# Patient Record
Sex: FEMALE | Race: WHITE | Employment: UNEMPLOYED
[De-identification: names, ages, dates, MRNs, and addresses within clinical notes are randomized per-mention and may not be internally consistent; named-entity substitution may affect disease eponyms.]

---

## 2017-11-12 ENCOUNTER — HEALTH MAINTENANCE LETTER (OUTPATIENT)
Age: 52
End: 2017-11-12

## 2018-03-20 ENCOUNTER — HOSPITAL ENCOUNTER (OUTPATIENT)
Dept: MAMMOGRAPHY | Facility: CLINIC | Age: 53
Discharge: HOME | End: 2018-03-20
Attending: FAMILY MEDICINE | Admitting: FAMILY MEDICINE
Payer: COMMERCIAL

## 2018-03-20 ENCOUNTER — OFFICE VISIT (OUTPATIENT)
Dept: FAMILY MEDICINE | Facility: CLINIC | Age: 53
End: 2018-03-20
Payer: COMMERCIAL

## 2018-03-20 VITALS
DIASTOLIC BLOOD PRESSURE: 80 MMHG | SYSTOLIC BLOOD PRESSURE: 124 MMHG | WEIGHT: 178.5 LBS | HEART RATE: 66 BPM | BODY MASS INDEX: 27.05 KG/M2 | HEIGHT: 68 IN

## 2018-03-20 DIAGNOSIS — B35.1 ONYCHOMYCOSIS: ICD-10-CM

## 2018-03-20 DIAGNOSIS — Z13.1 SCREENING FOR DIABETES MELLITUS: ICD-10-CM

## 2018-03-20 DIAGNOSIS — Z12.11 SPECIAL SCREENING FOR MALIGNANT NEOPLASMS, COLON: ICD-10-CM

## 2018-03-20 DIAGNOSIS — Z00.00 ROUTINE GENERAL MEDICAL EXAMINATION AT A HEALTH CARE FACILITY: Primary | ICD-10-CM

## 2018-03-20 DIAGNOSIS — Z80.0 FAMILY HISTORY OF COLON CANCER: ICD-10-CM

## 2018-03-20 DIAGNOSIS — Z13.6 CARDIOVASCULAR SCREENING; LDL GOAL LESS THAN 160: ICD-10-CM

## 2018-03-20 DIAGNOSIS — Z12.31 VISIT FOR SCREENING MAMMOGRAM: ICD-10-CM

## 2018-03-20 LAB
ALBUMIN SERPL-MCNC: 3.8 G/DL (ref 3.4–5)
ALP SERPL-CCNC: 60 U/L (ref 40–150)
ALT SERPL W P-5'-P-CCNC: 22 U/L (ref 0–50)
AST SERPL W P-5'-P-CCNC: 16 U/L (ref 0–45)
BILIRUB DIRECT SERPL-MCNC: <0.1 MG/DL (ref 0–0.2)
BILIRUB SERPL-MCNC: 0.4 MG/DL (ref 0.2–1.3)
CHOLEST SERPL-MCNC: 213 MG/DL
GLUCOSE SERPL-MCNC: 84 MG/DL (ref 70–99)
HDLC SERPL-MCNC: 80 MG/DL
LDLC SERPL CALC-MCNC: 123 MG/DL
NONHDLC SERPL-MCNC: 133 MG/DL
PROT SERPL-MCNC: 7.7 G/DL (ref 6.8–8.8)
TRIGL SERPL-MCNC: 50 MG/DL

## 2018-03-20 PROCEDURE — 77067 SCR MAMMO BI INCL CAD: CPT

## 2018-03-20 PROCEDURE — 80076 HEPATIC FUNCTION PANEL: CPT | Performed by: FAMILY MEDICINE

## 2018-03-20 PROCEDURE — 36415 COLL VENOUS BLD VENIPUNCTURE: CPT | Performed by: FAMILY MEDICINE

## 2018-03-20 PROCEDURE — 99396 PREV VISIT EST AGE 40-64: CPT | Performed by: FAMILY MEDICINE

## 2018-03-20 PROCEDURE — 82947 ASSAY GLUCOSE BLOOD QUANT: CPT | Performed by: FAMILY MEDICINE

## 2018-03-20 PROCEDURE — 80061 LIPID PANEL: CPT | Performed by: FAMILY MEDICINE

## 2018-03-20 RX ORDER — TERBINAFINE HYDROCHLORIDE 250 MG/1
250 TABLET ORAL DAILY
Qty: 90 TABLET | Refills: 0 | Status: SHIPPED | OUTPATIENT
Start: 2018-03-20 | End: 2021-03-26

## 2018-03-20 NOTE — PATIENT INSTRUCTIONS
1. To lab for blood work  Schedule the colonoscopy [phone]    Preventive Health Recommendations  Female Ages 50 - 64    Yearly exam: See your health care provider every year in order to  o Review health changes.   o Discuss preventive care.    o Review your medicines if your doctor has prescribed any.      Get a Pap test every three years (unless you have an abnormal result and your provider advises testing more often).    If you get Pap tests with HPV test, you only need to test every 5 years, unless you have an abnormal result.     You do not need a Pap test if your uterus was removed (hysterectomy) and you have not had cancer.    You should be tested each year for STDs (sexually transmitted diseases) if you're at risk.     Have a mammogram every 1 to 2 years.    Have a colonoscopy at age 50, or have a yearly FIT test (stool test). These exams screen for colon cancer.      Have a cholesterol test every 5 years, or more often if advised.    Have a diabetes test (fasting glucose) every three years. If you are at risk for diabetes, you should have this test more often.     If you are at risk for osteoporosis (brittle bone disease), think about having a bone density scan (DEXA).    Shots: Get a flu shot each year. Get a tetanus shot every 10 years.    Nutrition:     Eat at least 5 servings of fruits and vegetables each day.    Eat whole-grain bread, whole-wheat pasta and brown rice instead of white grains and rice.    Talk to your provider about Calcium and Vitamin D.     Lifestyle    Exercise at least 150 minutes a week (30 minutes a day, 5 days a week). This will help you control your weight and prevent disease.    Limit alcohol to one drink per day.    No smoking.     Wear sunscreen to prevent skin cancer.     See your dentist every six months for an exam and cleaning.    See your eye doctor every 1 to 2 years.

## 2018-03-20 NOTE — LETTER
March 20, 2018      Lisa Fontaine  [address] [address]        Dear ,    We are writing to inform you of your test results.    Cholesterol was just a little high, not too high to need medication.  To improve your cholesterol exercise 30 minutes per day five days a week, increase eating fresh or frozen fruits and vegetables at least 5 per day, increase eating lean meats like fish, and avoid fried foods.  Normal glucose and liver tests.    Resulted Orders   Lipid panel reflex to direct LDL Fasting   Result Value Ref Range    Cholesterol 213 (H) <200 mg/dL      Comment:      Desirable:       <200 mg/dl    Triglycerides 50 <150 mg/dL    HDL Cholesterol 80 >49 mg/dL    LDL Cholesterol Calculated 123 (H) <100 mg/dL      Comment:      Above desirable:  100-129 mg/dl  Borderline High:  130-159 mg/dL  High:             160-189 mg/dL  Very high:       >189 mg/dl      Non HDL Cholesterol 133 (H) <130 mg/dL      Comment:      Above Desirable:  130-159 mg/dl  Borderline high:  160-189 mg/dl  High:             190-219 mg/dl  Very high:       >219 mg/dl     GLUCOSE   Result Value Ref Range    Glucose 84 70 - 99 mg/dL   Hepatic panel   Result Value Ref Range    Bilirubin Direct <0.1 0.0 - 0.2 mg/dL    Bilirubin Total 0.4 0.2 - 1.3 mg/dL    Albumin 3.8 3.4 - 5.0 g/dL    Protein Total 7.7 6.8 - 8.8 g/dL    Alkaline Phosphatase 60 40 - 150 U/L    ALT 22 0 - 50 U/L    AST 16 0 - 45 U/L       If you have any questions or concerns, please call the clinic at the number listed above.       Sincerely,        Willis Diaz MD/cb

## 2018-03-20 NOTE — NURSING NOTE
"Chief Complaint   Patient presents with     Physical       Initial /80  Pulse 66  Ht 5' 8\" (1.727 m)  Wt 178 lb 8 oz (81 kg)  LMP 01/01/2008  BMI 27.14 kg/m2 Estimated body mass index is 27.14 kg/(m^2) as calculated from the following:    Height as of this encounter: 5' 8\" (1.727 m).    Weight as of this encounter: 178 lb 8 oz (81 kg).  Medication Reconciliation: complete  "

## 2018-03-20 NOTE — PROGRESS NOTES
SUBJECTIVE:   CC: Lisa Fontaine is an 52 year old woman who presents for preventive health visit.     Healthy Habits:    Do you get at least three servings of calcium containing foods daily (dairy, green leafy vegetables, etc.)? yes    Amount of exercise or daily activities, outside of work: 3 day(s) per week    Problems taking medications regularly No    Medication side effects: No    Have you had an eye exam in the past two years? yes    Do you see a dentist twice per year? yes    Do you have sleep apnea, excessive snoring or daytime drowsiness?no    - Check skin, she notices periotic red spots on legs and abdomen.      Today's PHQ-2 Score:   PHQ-2 ( 1999 [employer]) 10/7/2014 4/4/2012   Q1: Little interest or pleasure in doing things 0 1   Q2: Feeling down, depressed or hopeless 1 0   PHQ-2 Score 1 1       Abuse: Current or Past(Physical, Sexual or Emotional)- No  Do you feel safe in your environment - Yes    Social History   Substance Use Topics     Smoking status: Never Smoker     Smokeless tobacco: Never Used     Alcohol use Yes      Comment: rare     If you drink alcohol do you typically have >3 drinks per day or >7 drinks per week? No                     Reviewed orders with patient.  Reviewed health maintenance and updated orders accordingly - Yes  BP Readings from Last 3 Encounters:   03/20/18 124/80   03/09/16 112/59   10/07/14 (!) 156/94    Wt Readings from Last 3 Encounters:   03/20/18 178 lb 8 oz (81 kg)   03/09/16 250 lb (113.4 kg)   10/07/14 243 lb 8 oz (110.5 kg)           Patient over age 50, mutual decision to screen reflected in health maintenance.    Pertinent mammograms are reviewed under the imaging tab.  History of abnormal Pap smear: NO - age 30- 65 PAP every 3 years recommended    Reviewed and updated as needed this visit by clinical staff         Reviewed and updated as needed this visit by Provider            ROS:  C: NEGATIVE for fever, chills, change in weight  I: NEGATIVE for  "worrisome rashes, moles or lesions  E: NEGATIVE for vision changes or irritation  ENT: NEGATIVE for ear, mouth and throat problems  R: NEGATIVE for significant cough or SOB  B: NEGATIVE for masses, tenderness or discharge  CV: NEGATIVE for chest pain, palpitations or peripheral edema  GI: NEGATIVE for nausea, abdominal pain, heartburn, or change in bowel habits  : NEGATIVE for unusual urinary or vaginal symptoms. No vaginal bleeding.  M: NEGATIVE for significant arthralgias or myalgia  N: NEGATIVE for weakness, dizziness or paresthesias  P: NEGATIVE for changes in mood or affect     OBJECTIVE:   /80  Pulse 66  Ht 5' 8\" (1.727 m)  Wt 178 lb 8 oz (81 kg)  LMP 01/01/2008  BMI 27.14 kg/m2  EXAM:  GENERAL: healthy, alert and no distress  EYES: Eyes grossly normal to inspection, PERRL and conjunctivae and sclerae normal  HENT: ear canals and TM's normal, nose and mouth without ulcers or lesions  NECK: no adenopathy, no asymmetry, masses, or scars and thyroid normal to palpation  RESP: lungs clear to auscultation - no rales, rhonchi or wheezes  BREAST: normal without masses, tenderness or nipple discharge and no palpable axillary masses or adenopathy  CV: regular rate and rhythm, normal S1 S2, no S3 or S4, no murmur, click or rub, no peripheral edema and peripheral pulses strong  ABDOMEN: soft, nontender, no hepatosplenomegaly, no masses and bowel sounds normal  MS: no gross musculoskeletal defects noted, no edema  GYN: normal external genitalia (no skin tags seen)  SKIN: Toe nails right toes thickened yellow brittle, no suspicious lesions or rashes  NEURO: Normal strength and tone, mentation intact and speech normal  PSYCH: mentation appears normal, affect normal/bright    ASSESSMENT/PLAN:   Lisa was seen today for physical.    Diagnoses and all orders for this visit:    Routine general medical examination at a health care facility    CARDIOVASCULAR SCREENING; LDL GOAL LESS THAN 160  -     Lipid panel " "reflex to direct LDL Fasting    Screening for diabetes mellitus  -     GLUCOSE    Onychomycosis  -     terbinafine (LAMISIL) 250 MG tablet; Take 1 tablet (250 mg) by mouth daily  -     Hepatic panel  -     Hepatic panel; Future    Special screening for malignant neoplasms, colon  -     GASTROENTEROLOGY ADULT REF PROCEDURE ONLY    Family history of colon cancer  mother  -     GASTROENTEROLOGY ADULT REF PROCEDURE ONLY        COUNSELING:   Reviewed preventive health counseling, as reflected in patient instructions       Regular exercise       Healthy diet/nutrition       Vision screening       Colon cancer screening         reports that she has never smoked. She has never used smokeless tobacco.    Estimated body mass index is 38.01 kg/(m^2) as calculated from the following:    Height as of 3/9/16: 5' 8\" (1.727 m).    Weight as of 3/9/16: 250 lb (113.4 kg).   Weight management plan: Discussed healthy diet and exercise guidelines and patient will follow up in 12 months in clinic to re-evaluate. Weight decreasing after S[employer], then stopped 2 months ago and weight has gone up so planning to go back to that plan.    Counseling Resources:  ATP IV Guidelines  Pooled Cohorts Equation Calculator  Breast Cancer Risk Calculator  FRAX Risk Assessment  ICSI Preventive Guidelines  Dietary Guidelines for Americans, 2010  USDA's MyPlate  ASA Prophylaxis  Lung CA Screening    Willis Diaz MD  [facility]  "

## 2018-03-20 NOTE — MR AVS SNAPSHOT
After Visit Summary   3/20/2018    Lisa Fontaine    MRN: [id number]           Patient Information     Date Of Birth          1965        Visit Information        Provider Department      3/20/2018 8:00 AM Willis Diaz MD [facility]        Today's Diagnoses     Routine general medical examination at a health care facility    -  1    CARDIOVASCULAR SCREENING; LDL GOAL LESS THAN 160        Screening for diabetes mellitus        Onychomycosis        Special screening for malignant neoplasms, colon        Family history of colon cancer  mother          Care Instructions    1. To lab for blood work  Schedule the colonoscopy [phone]    Preventive Health Recommendations  Female Ages 50 - 64    Yearly exam: See your health care provider every year in order to  o Review health changes.   o Discuss preventive care.    o Review your medicines if your doctor has prescribed any.      Get a Pap test every three years (unless you have an abnormal result and your provider advises testing more often).    If you get Pap tests with HPV test, you only need to test every 5 years, unless you have an abnormal result.     You do not need a Pap test if your uterus was removed (hysterectomy) and you have not had cancer.    You should be tested each year for STDs (sexually transmitted diseases) if you're at risk.     Have a mammogram every 1 to 2 years.    Have a colonoscopy at age 50, or have a yearly FIT test (stool test). These exams screen for colon cancer.      Have a cholesterol test every 5 years, or more often if advised.    Have a diabetes test (fasting glucose) every three years. If you are at risk for diabetes, you should have this test more often.     If you are at risk for osteoporosis (brittle bone disease), think about having a bone density scan (DEXA).    Shots: Get a flu shot each year. Get a tetanus shot every 10 years.    Nutrition:     Eat at least 5 servings of fruits and  vegetables each day.    Eat whole-grain bread, whole-wheat pasta and brown rice instead of white grains and rice.    Talk to your provider about Calcium and Vitamin D.     Lifestyle    Exercise at least 150 minutes a week (30 minutes a day, 5 days a week). This will help you control your weight and prevent disease.    Limit alcohol to one drink per day.    No smoking.     Wear sunscreen to prevent skin cancer.     See your dentist every six months for an exam and cleaning.    See your eye doctor every 1 to 2 years.            Follow-ups after your visit        Additional Services     GASTROENTEROLOGY ADULT REF PROCEDURE ONLY       Last Lab Result: Creatinine (mg/dL)       Date                     Value                 04/04/2012               0.59             ----------  Body mass index is 27.14 kg/(m^2).     Needed:  No  Language:  English    Patient will be contacted to schedule procedure.     Please be aware that coverage of these services is subject to the terms and limitations of your health insurance plan.  Call member services at your health plan with any benefit or coverage questions.  Any procedures must be performed at a [place] facility OR coordinated by your clinic's referral office.    Please bring the following with you to your appointment:    (1) Any X-Rays, CTs or MRIs which have been performed.  Contact the facility where they were done to arrange for  prior to your scheduled appointment.    (2) List of current medications   (3) This referral request   (4) Any documents/labs given to you for this referral                  Future tests that were ordered for you today     Open Future Orders        Priority Expected Expires Ordered    Hepatic panel Routine  4/20/2018 3/20/2018    MA Screening Digital Bilateral Routine  3/19/2019 3/19/2018            Who to contact     If you have questions or need follow up information about today's clinic visit or your schedule please contact  "[facility] directly at [phone].  Normal or non-critical lab and imaging results will be communicated to you by MyChart, letter or phone within 4 business days after the clinic has received the results. If you do not hear from us within 7 days, please contact the clinic through MyChart or phone. If you have a critical or abnormal lab result, we will notify you by phone as soon as possible.  Submit refill requests through Evi or call your pharmacy and they will forward the refill request to us. Please allow 3 business days for your refill to be completed.          Additional Information About Your Visit        GeoVarioharDriftToIt Information     Evi lets you send messages to your doctor, view your test results, renew your prescriptions, schedule appointments and more. To sign up, go to [email or web address] . Click on \"Log in\" on the left side of the screen, which will take you to the Welcome page. Then click on \"Sign up Now\" on the right side of the page.     You will be asked to enter the access code listed below, as well as some personal information. Please follow the directions to create your username and password.     Your access code is: [email or web address]  Expires: 2018  8:50 AM     Your access code will  in 90 days. If you need help or a new code, please call your [facility] clinic or [phone].        Care EveryWhere ID     This is your Care EveryWhere ID. This could be used by other organizations to access your [facility] medical records  [id number]        Your Vitals Were     Pulse Height Last Period BMI (Body Mass Index)          66 5' 8\" (1.727 m) 2008 27.14 kg/m2         Blood Pressure from Last 3 Encounters:   18 124/80   16 112/59   10/07/14 (!) 156/94    Weight from Last 3 Encounters:   18 178 lb 8 oz (81 kg)   16 250 lb (113.4 kg)   10/07/14 243 lb 8 oz (110.5 kg)              We Performed the Following     GASTROENTEROLOGY ADULT REF PROCEDURE ONLY "     GLUCOSE     Hepatic panel     Lipid panel reflex to direct LDL Fasting          Today's Medication Changes          These changes are accurate as of 3/20/18  8:50 AM.  If you have any questions, ask your nurse or doctor.               Start taking these medicines.        Dose/Directions    terbinafine 250 MG tablet   Commonly known as:  lamISIL   Used for:  Onychomycosis   Started by:  Willis Diaz MD        Dose:  250 mg   Take 1 tablet (250 mg) by mouth daily   Quantity:  90 tablet   Refills:  0         Stop taking these medicines if you haven't already. Please contact your care team if you have questions.     albuterol 108 (90 BASE) MCG/ACT Inhaler   Commonly known as:  PROAIR HFA/PROVENTIL HFA/VENTOLIN HFA   Stopped by:  Willis Diaz MD           amoxicillin-clavulanate 875-125 MG per tablet   Commonly known as:  AUGMENTIN   Stopped by:  Willis Diaz MD                Where to get your medicines      These medications were sent to [facility] [address] [address]     Phone:  [phone]     terbinafine 250 MG tablet                Primary Care Provider Office Phone # Fax #    Maura Orlando Mcduffie [phone] [address] [facility] [address]        Equal Access to Services     HARIS COUGHLIN AH: Hadii roberto ku hadasho Soomaali, waaxda luqadaha, qaybta kaalmada adeegyada, waxjazmin moffettin hayninfan nerissa adrian. So [facility] [phone].    ATENCIÓN: Si habla español, tiene a man disposición servicios gratuitos de asistencia lingüística. Llame al [phone].    We comply with applicable federal civil rights laws and [place] laws. We do not discriminate on the basis of race, color, national origin, age, disability, sex, sexual orientation, or gender identity.            Thank you!     Thank you for choosing [facility]  for your care. Our goal is always to provide you with excellent  care. Hearing back from our patients is one way we can continue to improve our services. Please take a few minutes to complete the written survey that you may receive in the mail after your visit with us. Thank you!             Your Updated Medication List - Protect others around you: Learn how to safely use, store and throw away your medicines at www.disposemymeds.org.          This list is accurate as of 3/20/18  8:50 AM.  Always use your most recent med list.                   Brand Name Dispense Instructions for use Diagnosis    ADVIL PM PO      Take  by mouth as needed.        B-12 PO           BIOTIN PO      Take  by mouth as needed.        DAILY MULTIVITAMIN PO      Take  by mouth daily.        OMEGA 3 PO      Take  by mouth as needed.        terbinafine 250 MG tablet    lamISIL    90 tablet    Take 1 tablet (250 mg) by mouth daily    Onychomycosis       VITAMIN-B COMPLEX PO      Take  by mouth daily.        ZINC CITRATE      as needed.

## 2018-04-16 DIAGNOSIS — B35.1 ONYCHOMYCOSIS: ICD-10-CM

## 2018-04-16 LAB
ALBUMIN SERPL-MCNC: 3.6 G/DL (ref 3.4–5)
ALP SERPL-CCNC: 64 U/L (ref 40–150)
ALT SERPL W P-5'-P-CCNC: 20 U/L (ref 0–50)
AST SERPL W P-5'-P-CCNC: 18 U/L (ref 0–45)
BILIRUB DIRECT SERPL-MCNC: <0.1 MG/DL (ref 0–0.2)
BILIRUB SERPL-MCNC: 0.3 MG/DL (ref 0.2–1.3)
PROT SERPL-MCNC: 7.1 G/DL (ref 6.8–8.8)

## 2018-04-16 PROCEDURE — 80076 HEPATIC FUNCTION PANEL: CPT | Performed by: FAMILY MEDICINE

## 2018-04-16 PROCEDURE — 36415 COLL VENOUS BLD VENIPUNCTURE: CPT | Performed by: FAMILY MEDICINE

## 2018-05-01 ENCOUNTER — ANESTHESIA EVENT (OUTPATIENT)
Dept: GASTROENTEROLOGY | Facility: CLINIC | Age: 53
End: 2018-05-01
Payer: COMMERCIAL

## 2018-05-01 NOTE — ANESTHESIA PREPROCEDURE EVALUATION
Anesthesia Evaluation     . Pt has had prior anesthetic. Type: General and MAC    No history of anesthetic complications          ROS/MED HX    ENT/Pulmonary:  - neg pulmonary ROS     Neurologic:  - neg neurologic ROS     Cardiovascular:  - neg cardiovascular ROS       METS/Exercise Tolerance:  >4 METS   Hematologic:  - neg hematologic  ROS       Musculoskeletal: Comment: Low back pain     Lumbar radiculopathy     - neg musculoskeletal ROS       GI/Hepatic:  - neg GI/hepatic ROS       Renal/Genitourinary:  - ROS Renal section negative       Endo:     (+) Obesity, .      Psychiatric:  - neg psychiatric ROS       Infectious Disease:  - neg infectious disease ROS       Malignancy:      - no malignancy   Other:    - neg other ROS                 Physical Exam  Normal systems: cardiovascular and pulmonary    Airway   Mallampati: II  TM distance: <3 FB  Neck ROM: full    Dental   (+) caps    Cardiovascular       Pulmonary                     Anesthesia Plan      History & Physical Review      ASA Status:  1 .    NPO Status:  > 4 hours    Plan for MAC Reason for MAC:  Deep or markedly invasive procedure (G8)         Postoperative Care      Consents  Anesthetic plan, risks, benefits and alternatives discussed with:  Patient..                          .

## 2018-05-02 ENCOUNTER — ANESTHESIA (OUTPATIENT)
Dept: GASTROENTEROLOGY | Facility: CLINIC | Age: 53
End: 2018-05-02
Payer: COMMERCIAL

## 2018-05-02 ENCOUNTER — SURGERY (OUTPATIENT)
Age: 53
End: 2018-05-02
Payer: COMMERCIAL

## 2018-05-02 ENCOUNTER — HOSPITAL ENCOUNTER (OUTPATIENT)
Facility: CLINIC | Age: 53
Discharge: HOME | End: 2018-05-02
Attending: FAMILY MEDICINE | Admitting: FAMILY MEDICINE
Payer: COMMERCIAL

## 2018-05-02 VITALS
WEIGHT: 180 LBS | TEMPERATURE: 98.3 F | OXYGEN SATURATION: 96 % | DIASTOLIC BLOOD PRESSURE: 81 MMHG | BODY MASS INDEX: 27.28 KG/M2 | SYSTOLIC BLOOD PRESSURE: 122 MMHG | HEIGHT: 68 IN | RESPIRATION RATE: 16 BRPM

## 2018-05-02 LAB — COLONOSCOPY: NORMAL

## 2018-05-02 PROCEDURE — G0105 COLORECTAL SCRN; HI RISK IND: HCPCS | Performed by: SURGERY

## 2018-05-02 PROCEDURE — 25000125 ZZHC RX 250: Performed by: SURGERY

## 2018-05-02 PROCEDURE — 25000128 H RX IP 250 OP 636: Performed by: NURSE ANESTHETIST, CERTIFIED REGISTERED

## 2018-05-02 PROCEDURE — 25000128 H RX IP 250 OP 636: Performed by: SURGERY

## 2018-05-02 PROCEDURE — 45378 DIAGNOSTIC COLONOSCOPY: CPT | Performed by: SURGERY

## 2018-05-02 PROCEDURE — 25000125 ZZHC RX 250: Performed by: NURSE ANESTHETIST, CERTIFIED REGISTERED

## 2018-05-02 PROCEDURE — 37000008 ZZH ANESTHESIA TECHNICAL FEE, 1ST 30 MIN: Performed by: SURGERY

## 2018-05-02 RX ORDER — LIDOCAINE 40 MG/G
CREAM TOPICAL
Status: DISCONTINUED | OUTPATIENT
Start: 2018-05-02 | End: 2018-05-02 | Stop reason: HOSPADM

## 2018-05-02 RX ORDER — SODIUM CHLORIDE, SODIUM LACTATE, POTASSIUM CHLORIDE, CALCIUM CHLORIDE 600; 310; 30; 20 MG/100ML; MG/100ML; MG/100ML; MG/100ML
INJECTION, SOLUTION INTRAVENOUS CONTINUOUS
Status: DISCONTINUED | OUTPATIENT
Start: 2018-05-02 | End: 2018-05-02 | Stop reason: HOSPADM

## 2018-05-02 RX ORDER — PROPOFOL 10 MG/ML
INJECTION, EMULSION INTRAVENOUS CONTINUOUS PRN
Status: DISCONTINUED | OUTPATIENT
Start: 2018-05-02 | End: 2018-05-02

## 2018-05-02 RX ORDER — ONDANSETRON 2 MG/ML
4 INJECTION INTRAMUSCULAR; INTRAVENOUS
Status: DISCONTINUED | OUTPATIENT
Start: 2018-05-02 | End: 2018-05-02 | Stop reason: HOSPADM

## 2018-05-02 RX ORDER — GLYCOPYRROLATE 0.2 MG/ML
INJECTION, SOLUTION INTRAMUSCULAR; INTRAVENOUS PRN
Status: DISCONTINUED | OUTPATIENT
Start: 2018-05-02 | End: 2018-05-02

## 2018-05-02 RX ORDER — LIDOCAINE HYDROCHLORIDE 10 MG/ML
INJECTION, SOLUTION EPIDURAL; INFILTRATION; INTRACAUDAL; PERINEURAL PRN
Status: DISCONTINUED | OUTPATIENT
Start: 2018-05-02 | End: 2018-05-02

## 2018-05-02 RX ORDER — PROPOFOL 10 MG/ML
INJECTION, EMULSION INTRAVENOUS PRN
Status: DISCONTINUED | OUTPATIENT
Start: 2018-05-02 | End: 2018-05-02

## 2018-05-02 RX ADMIN — GLYCOPYRROLATE 0.2 MG: 0.2 INJECTION, SOLUTION INTRAMUSCULAR; INTRAVENOUS at 07:59

## 2018-05-02 RX ADMIN — LIDOCAINE HYDROCHLORIDE 50 MG: 10 INJECTION, SOLUTION EPIDURAL; INFILTRATION; INTRACAUDAL; PERINEURAL at 07:59

## 2018-05-02 RX ADMIN — PROPOFOL 200 MCG/KG/MIN: 10 INJECTION, EMULSION INTRAVENOUS at 08:00

## 2018-05-02 RX ADMIN — LIDOCAINE HYDROCHLORIDE 1 ML: 10 INJECTION, SOLUTION EPIDURAL; INFILTRATION; INTRACAUDAL; PERINEURAL at 07:25

## 2018-05-02 RX ADMIN — PROPOFOL 100 MG: 10 INJECTION, EMULSION INTRAVENOUS at 08:00

## 2018-05-02 RX ADMIN — SODIUM CHLORIDE, POTASSIUM CHLORIDE, SODIUM LACTATE AND CALCIUM CHLORIDE: 600; 310; 30; 20 INJECTION, SOLUTION INTRAVENOUS at 07:25

## 2018-05-02 RX ADMIN — PROPOFOL 50 MCG/KG/MIN: 10 INJECTION, EMULSION INTRAVENOUS at 08:06

## 2018-05-02 NOTE — ANESTHESIA CARE TRANSFER NOTE
Patient: Lisa Fontaine    Procedure(s):  colonoscopy - Wound Class: II-Clean Contaminated    Diagnosis: family history of colon cancer    screening  Diagnosis Additional Information: No value filed.    Anesthesia Type:   MAC     Note:  Airway :Room Air  Patient transferred to:Phase II  Handoff Report: Identifed the Patient, Identified the Reponsible Provider, Reviewed the pertinent medical history, Discussed the surgical course, Reviewed Intra-OP anesthesia mangement and issues during anesthesia, Set expectations for post-procedure period and Allowed opportunity for questions and acknowledgement of understanding      Vitals: (Last set prior to Anesthesia Care Transfer)    CRNA VITALS  5/2/2018 0741 - 5/2/2018 0811      5/2/2018             Pulse: 81    Ht Rate: 81    SpO2: 99 %                Electronically Signed By: SENTHIL Drake CRNA  May 2, 2018  8:11 AM

## 2018-05-02 NOTE — ANESTHESIA POSTPROCEDURE EVALUATION
Patient: Lisa Fontaine    Procedure(s):  colonoscopy - Wound Class: II-Clean Contaminated    Diagnosis:family history of colon cancer    screening  Diagnosis Additional Information: No value filed.    Anesthesia Type:  MAC    Note:  Anesthesia Post Evaluation    Patient location during evaluation: Bedside  Patient participation: Able to fully participate in evaluation  Level of consciousness: awake and alert  Pain management: adequate  Airway patency: patent  Cardiovascular status: acceptable  Respiratory status: acceptable  Hydration status: acceptable  PONV: none     Anesthetic complications: None          Last vitals:  Vitals:    05/02/18 0653   BP: 112/71   Resp: 16   Temp: 36.8  C (98.3  F)   SpO2: 100%         Electronically Signed By: SENTHIL Drake CRNA  May 2, 2018  8:12 AM

## 2021-03-26 ENCOUNTER — TELEPHONE (OUTPATIENT)
Dept: BEHAVIORAL HEALTH | Facility: CLINIC | Age: 56
End: 2021-03-26

## 2021-03-26 ENCOUNTER — HOSPITAL ENCOUNTER (INPATIENT)
Facility: CLINIC | Age: 56
LOS: 3 days | Discharge: HOME | DRG: 882 | End: 2021-03-29
Attending: PSYCHIATRY & NEUROLOGY | Admitting: PSYCHIATRY & NEUROLOGY
Payer: COMMERCIAL

## 2021-03-26 DIAGNOSIS — F31.2 BIPOLAR AFFECTIVE DISORDER, MANIC, SEVERE, WITH PSYCHOTIC BEHAVIOR (H): ICD-10-CM

## 2021-03-26 DIAGNOSIS — Z20.822 CONTACT WITH AND (SUSPECTED) EXPOSURE TO COVID-19: ICD-10-CM

## 2021-03-26 DIAGNOSIS — F29 PSYCHOSIS, UNSPECIFIED PSYCHOSIS TYPE (H): ICD-10-CM

## 2021-03-26 LAB
ALBUMIN SERPL-MCNC: 4.1 G/DL (ref 3.4–5)
ALP SERPL-CCNC: 84 U/L (ref 40–150)
ALT SERPL W P-5'-P-CCNC: 24 U/L (ref 0–50)
AMPHETAMINES UR QL SCN: NEGATIVE
ANION GAP SERPL CALCULATED.3IONS-SCNC: 6 MMOL/L (ref 3–14)
AST SERPL W P-5'-P-CCNC: 15 U/L (ref 0–45)
BARBITURATES UR QL: NEGATIVE
BASOPHILS # BLD AUTO: 0.1 10E9/L (ref 0–0.2)
BASOPHILS NFR BLD AUTO: 0.8 %
BENZODIAZ UR QL: NEGATIVE
BILIRUB SERPL-MCNC: 0.5 MG/DL (ref 0.2–1.3)
BUN SERPL-MCNC: 8 MG/DL (ref 7–30)
CALCIUM SERPL-MCNC: 9.4 MG/DL (ref 8.5–10.1)
CANNABINOIDS UR QL SCN: NEGATIVE
CHLORIDE SERPL-SCNC: 101 MMOL/L (ref 94–109)
CO2 SERPL-SCNC: 29 MMOL/L (ref 20–32)
COCAINE UR QL: NEGATIVE
CREAT SERPL-MCNC: 0.56 MG/DL (ref 0.52–1.04)
DIFFERENTIAL METHOD BLD: NORMAL
EOSINOPHIL # BLD AUTO: 0.1 10E9/L (ref 0–0.7)
EOSINOPHIL NFR BLD AUTO: 0.6 %
ERYTHROCYTE [DISTWIDTH] IN BLOOD BY AUTOMATED COUNT: 12.2 % (ref 10–15)
ETHANOL UR QL SCN: NEGATIVE
GFR SERPL CREATININE-BSD FRML MDRD: >90 ML/MIN/{1.73_M2}
GLUCOSE SERPL-MCNC: 89 MG/DL (ref 70–99)
HCT VFR BLD AUTO: 43 % (ref 35–47)
HGB BLD-MCNC: 14.7 G/DL (ref 11.7–15.7)
IMM GRANULOCYTES # BLD: 0 10E9/L (ref 0–0.4)
IMM GRANULOCYTES NFR BLD: 0.3 %
LABORATORY COMMENT REPORT: NORMAL
LYMPHOCYTES # BLD AUTO: 2.4 10E9/L (ref 0.8–5.3)
LYMPHOCYTES NFR BLD AUTO: 31.2 %
MCH RBC QN AUTO: 30.1 PG (ref 26.5–33)
MCHC RBC AUTO-ENTMCNC: 34.2 G/DL (ref 31.5–36.5)
MCV RBC AUTO: 88 FL (ref 78–100)
MONOCYTES # BLD AUTO: 0.8 10E9/L (ref 0–1.3)
MONOCYTES NFR BLD AUTO: 9.9 %
NEUTROPHILS # BLD AUTO: 4.4 10E9/L (ref 1.6–8.3)
NEUTROPHILS NFR BLD AUTO: 57.2 %
NRBC # BLD AUTO: 0 10*3/UL
NRBC BLD AUTO-RTO: 0 /100
OPIATES UR QL SCN: NEGATIVE
PLATELET # BLD AUTO: 358 10E9/L (ref 150–450)
POTASSIUM SERPL-SCNC: 4.2 MMOL/L (ref 3.4–5.3)
PROT SERPL-MCNC: 7.8 G/DL (ref 6.8–8.8)
RBC # BLD AUTO: 4.88 10E12/L (ref 3.8–5.2)
SARS-COV-2 RNA RESP QL NAA+PROBE: NEGATIVE
SODIUM SERPL-SCNC: 136 MMOL/L (ref 133–144)
SPECIMEN SOURCE: NORMAL
TSH SERPL DL<=0.005 MIU/L-ACNC: 1 MU/L (ref 0.4–4)
WBC # BLD AUTO: 7.8 10E9/L (ref 4–11)

## 2021-03-26 PROCEDURE — C9803 HOPD COVID-19 SPEC COLLECT: HCPCS | Performed by: PSYCHIATRY & NEUROLOGY

## 2021-03-26 PROCEDURE — 80307 DRUG TEST PRSMV CHEM ANLYZR: CPT | Performed by: PSYCHIATRY & NEUROLOGY

## 2021-03-26 PROCEDURE — 80320 DRUG SCREEN QUANTALCOHOLS: CPT | Performed by: PSYCHIATRY & NEUROLOGY

## 2021-03-26 PROCEDURE — 124N000002 HC R&B MH UMMC

## 2021-03-26 PROCEDURE — 85025 COMPLETE CBC W/AUTO DIFF WBC: CPT | Performed by: PSYCHIATRY & NEUROLOGY

## 2021-03-26 PROCEDURE — 99284 EMERGENCY DEPT VISIT MOD MDM: CPT | Performed by: PSYCHIATRY & NEUROLOGY

## 2021-03-26 PROCEDURE — 80053 COMPREHEN METABOLIC PANEL: CPT | Performed by: PSYCHIATRY & NEUROLOGY

## 2021-03-26 PROCEDURE — 87635 SARS-COV-2 COVID-19 AMP PRB: CPT | Performed by: PSYCHIATRY & NEUROLOGY

## 2021-03-26 PROCEDURE — 90791 PSYCH DIAGNOSTIC EVALUATION: CPT

## 2021-03-26 PROCEDURE — 84443 ASSAY THYROID STIM HORMONE: CPT | Performed by: PSYCHIATRY & NEUROLOGY

## 2021-03-26 PROCEDURE — 93005 ELECTROCARDIOGRAM TRACING: CPT

## 2021-03-26 PROCEDURE — 93010 ELECTROCARDIOGRAM REPORT: CPT | Performed by: INTERNAL MEDICINE

## 2021-03-26 PROCEDURE — 99285 EMERGENCY DEPT VISIT HI MDM: CPT | Mod: 25 | Performed by: PSYCHIATRY & NEUROLOGY

## 2021-03-26 RX ORDER — HYDROXYZINE HYDROCHLORIDE 25 MG/1
25 TABLET, FILM COATED ORAL EVERY 4 HOURS PRN
Status: DISCONTINUED | OUTPATIENT
Start: 2021-03-26 | End: 2021-03-29 | Stop reason: HOSPADM

## 2021-03-26 RX ORDER — OLANZAPINE 10 MG/1
10 TABLET ORAL 3 TIMES DAILY PRN
Status: DISCONTINUED | OUTPATIENT
Start: 2021-03-26 | End: 2021-03-29 | Stop reason: HOSPADM

## 2021-03-26 RX ORDER — OLANZAPINE 10 MG/2ML
10 INJECTION, POWDER, FOR SOLUTION INTRAMUSCULAR 3 TIMES DAILY PRN
Status: DISCONTINUED | OUTPATIENT
Start: 2021-03-26 | End: 2021-03-29 | Stop reason: HOSPADM

## 2021-03-26 RX ORDER — LANOLIN ALCOHOL/MO/W.PET/CERES
3 CREAM (GRAM) TOPICAL
Status: DISCONTINUED | OUTPATIENT
Start: 2021-03-26 | End: 2021-03-29 | Stop reason: HOSPADM

## 2021-03-26 ASSESSMENT — ACTIVITIES OF DAILY LIVING (ADL)
HEARING_DIFFICULTY_OR_DEAF: NO
CONCENTRATING,_REMEMBERING_OR_MAKING_DECISIONS_DIFFICULTY: NO
ADL_ASSESSMENT: WDL EXCEPT (NO ADDITIONAL)
WALKING_OR_CLIMBING_STAIRS_DIFFICULTY: NO
HYGIENE/GROOMING: INDEPENDENT
VISION_MANAGEMENT: CONTACTS
LAUNDRY: UNABLE TO COMPLETE
TOILETING_ISSUES: NO
DRESS: INDEPENDENT
NUMBER_OF_TIMES_PATIENT_HAS_FALLEN_WITHIN_LAST_SIX_MONTHS: 2
PATIENT_/_FAMILY_COMMUNICATION_STYLE: SPOKEN LANGUAGE (ENGLISH OR BILINGUAL)
DIFFICULTY_COMMUNICATING: NO
ORAL_HYGIENE: INDEPENDENT
FALL_HISTORY_WITHIN_LAST_SIX_MONTHS: YES
DRESSING/BATHING_DIFFICULTY: NO
WEAR_GLASSES_OR_BLIND: YES
DIFFICULTY_EATING/SWALLOWING: NO

## 2021-03-26 ASSESSMENT — ENCOUNTER SYMPTOMS
EYES NEGATIVE: 1
HALLUCINATIONS: 1
RESPIRATORY NEGATIVE: 1
NERVOUS/ANXIOUS: 1
CARDIOVASCULAR NEGATIVE: 1
CONSTITUTIONAL NEGATIVE: 1
SLEEP DISTURBANCE: 1
HYPERACTIVE: 0
MUSCULOSKELETAL NEGATIVE: 1
DECREASED CONCENTRATION: 1
GASTROINTESTINAL NEGATIVE: 1
NEUROLOGICAL NEGATIVE: 1

## 2021-03-26 ASSESSMENT — MIFFLIN-ST. JEOR: SCORE: 1548.9

## 2021-03-26 NOTE — ED PROVIDER NOTES
[facility] EMERGENCY DEPARTMENT ([place] [facility])     March 26, 2021  History     Chief Complaint   Patient presents with     Hallucinations     endorsing auditory hallucination, almost like she is possesed by another person.      CESAR Fontaine is a 55 year old female with a PMH of uterine fibroids S/P laparoscopic supracervical hysterectomy, onychomycosis and low back pain who presents to the ED today complaining of auditory hallucinations. Patient had no prior mental health history. She is not taking any psychotropics. She is accompanied by her co-worker (whom patient worked with for 23 years as an ) who was surprised that patient abruptly quit her job today. Spouse is also here and is concerned for her altered mental state. Patient reports having a presence inside her telling her things that she should be doing such as quitting her job. She describes absorbing energy and other people's thoughts. She told a rather convoluted story of hearing or learning about some sort of death culture or custom next week before [facility], and had mentioned death and suicide to her spouse. She has been sleeping poorly. She denies using drugs and alcohol. She denies acute medical concerns. She has no COVID symptoms.    There is family history of depression and brother had killed himself in 2015.    Please see DEC Crisis Assessment on 3/26/21 in Epic for further details.    I have reviewed the Medications, Allergies, Past Medical and Surgical History, and Social History in the [facility] system.    PAST MEDICAL HISTORY:   Past Medical History:   Diagnosis Date     Fibroid     hysterectomy       PAST SURGICAL HISTORY:   Past Surgical History:   Procedure Laterality Date     COLONOSCOPY  9/12/2012    Procedure: COLONOSCOPY;  Colonoscopy       COLONOSCOPY N/A 5/2/2018    Procedure: COLONOSCOPY;  colonoscopy;  Surgeon: Gurmeet Reeves MD;  Location: [facility]     HYSTERECTOMY  2008    does still have cervix/ovaries ,   did have fibroids       Past medical history, past surgical history, medications, and allergies were reviewed with the patient.     FAMILY HISTORY:   Family History   Problem Relation Age of Onset     Cerebrovascular Disease Mother      Cancer - colorectal Mother      Cancer Mother      Depression Mother      Alcohol/Drug Father      Eye Disorder Father      Cancer Brother        SOCIAL HISTORY:   Social History     Tobacco Use     Smoking status: Never Smoker     Smokeless tobacco: Never Used   Substance Use Topics     Alcohol use: Yes     Comment: rare     Social history was reviewed with the patient.       Patient's Medications   New Prescriptions    No medications on file   Previous Medications    B COMPLEX VITAMINS (VITAMIN-B COMPLEX PO)    Take  by mouth daily.    BIOTIN PO    Take  by mouth as needed.    CYANOCOBALAMIN (B-12 PO)        IBUPROFEN-DIPHENHYDRAMINE CIT (ADVIL PM PO)    Take  by mouth as needed.    MULTIPLE VITAMIN (DAILY MULTIVITAMIN PO)    Take  by mouth daily.    OMEGA-3 FATTY ACIDS (OMEGA 3 PO)    Take  by mouth as needed.    TERBINAFINE (LAMISIL) 250 MG TABLET    Take 1 tablet (250 mg) by mouth daily    ZINC CITRATE    as needed.   Modified Medications    No medications on file   Discontinued Medications    No medications on file          Allergies   Allergen Reactions     Nkda [No Known Drug Allergies]         Review of Systems   Constitutional: Negative.    HENT: Negative.    Eyes: Negative.    Respiratory: Negative.    Cardiovascular: Negative.    Gastrointestinal: Negative.    Genitourinary: Negative.    Musculoskeletal: Negative.    Skin: Negative.    Neurological: Negative.    Psychiatric/Behavioral: Positive for decreased concentration, hallucinations, sleep disturbance and suicidal ideas. The patient is nervous/anxious. The patient is not hyperactive.    All other systems reviewed and are negative.        Physical Exam   BP: (!) 155/77  Pulse: 71  Temp: 98.5  F (36.9  C)  Resp:  16  SpO2: 99 %      Physical Exam  Vitals signs and nursing note reviewed.   HENT:      Head: Normocephalic.   Eyes:      Pupils: Pupils are equal, round, and reactive to light.   Neck:      Musculoskeletal: Normal range of motion.   Abdominal:      General: Abdomen is flat.   Musculoskeletal: Normal range of motion.   Neurological:      General: No focal deficit present.      Mental Status: She is alert.   Psychiatric:         Attention and Perception: Attention and perception normal. She does not perceive auditory or visual hallucinations.         Mood and Affect: Mood is anxious. Affect is inappropriate.         Speech: Speech is rapid and pressured and tangential.         Behavior: Behavior normal. Behavior is not agitated, aggressive, hyperactive or combative. Behavior is cooperative.         Thought Content: Thought content is paranoid and delusional. Thought content does not include homicidal or suicidal ideation.         Cognition and Memory: Cognition and memory normal.         Judgment: Judgment normal.         ED Course        Procedures               No results found for this or any previous visit (from the past 24 hour(s)).  Medications - No data to display          Assessments & Plan (with Medical Decision Making)   Patient with emergence of psychosis who is following directives of what a presence inside her is telling her. She abruptly quit her job today. She appears guarded and exhibits referential thinking. Patient appears impaired in insight and would benefit from an admission for further work-up of possibly first onset nova or psychosis, and for stabilization. She agrees to it. Her spouse agrees. She was referred.    I have reviewed the nursing notes.    I have reviewed the findings, diagnosis, plan and need for follow up with the patient.    New Prescriptions    No medications on file       Final diagnoses:   Psychosis, unspecified psychosis type (H)       3/26/2021   [facility]  EMERGENCY DEPARTMENT     Liborio Berry MD  03/26/21 1952

## 2021-03-26 NOTE — ED TRIAGE NOTES
Patient quit her job this morning because she claims that the spirit inside her told her so. Patient BIB  and co worker.

## 2021-03-26 NOTE — TELEPHONE ENCOUNTER
"S: Loretta, [place] [facility], 55/F, first episode psychosis     B: Pt BIB  and friend    reports the pt is calmly psychotic, pleasant during assessment   Pt believes there is an attachment inside of her that is communicating w her. Refers to \"it\" or \"he\". It told her to quit her job today, so she did which prompted the ED visit  Pt repots difficulty sleeping because it is keeping her busy, telling her to run out of the room, pushing her, taking her clothes off, she has a guardian binh and there is an entity living in the house.   Pt reports that she can recognize this wasn't happening a month ago. She reports experiencing them about a month ago.  reports beh changes for the last 2 weeks, more withdrawn, staring into space, not talk to him. She disclosed to him on Sunday the things that have been happening.   No hx of IP MH  Pt denies sub use   Pt denies SI/HI - the entity is telling her really bad things about her , he is cheating, etc   Pt reports next Wednesday is the Wednesday before Easter is the day that people committ suicide     Medically cleared, eating, drinking, ambulating indep  Patient cleared and ready for behavioral bed placement: Yes   No covid concerns, test ordered     A: Voluntary     R: Obinna/Fela     633pm - on call residentVinay, paged   640pm - Vinay accepts   Pt placed in queue   642pm - unit charge notified, 815pm for report   645pm - [facility] [place] notified     "

## 2021-03-27 LAB
B BURGDOR IGG+IGM SER QL: 0.22 (ref 0–0.89)
CHOLEST SERPL-MCNC: 189 MG/DL
ERYTHROCYTE [SEDIMENTATION RATE] IN BLOOD BY WESTERGREN METHOD: 8 MM/H (ref 0–30)
FOLATE SERPL-MCNC: 42.8 NG/ML
HDLC SERPL-MCNC: 82 MG/DL
HIV 1+2 AB+HIV1 P24 AG SERPL QL IA: NONREACTIVE
LDLC SERPL CALC-MCNC: 97 MG/DL
NONHDLC SERPL-MCNC: 107 MG/DL
T PALLIDUM AB SER QL: NONREACTIVE
TRIGL SERPL-MCNC: 51 MG/DL
VIT B12 SERPL-MCNC: 1512 PG/ML (ref 193–986)

## 2021-03-27 PROCEDURE — 82607 VITAMIN B-12: CPT | Performed by: STUDENT IN AN ORGANIZED HEALTH CARE EDUCATION/TRAINING PROGRAM

## 2021-03-27 PROCEDURE — 86038 ANTINUCLEAR ANTIBODIES: CPT | Performed by: STUDENT IN AN ORGANIZED HEALTH CARE EDUCATION/TRAINING PROGRAM

## 2021-03-27 PROCEDURE — 86780 TREPONEMA PALLIDUM: CPT | Performed by: STUDENT IN AN ORGANIZED HEALTH CARE EDUCATION/TRAINING PROGRAM

## 2021-03-27 PROCEDURE — 82390 ASSAY OF CERULOPLASMIN: CPT | Performed by: STUDENT IN AN ORGANIZED HEALTH CARE EDUCATION/TRAINING PROGRAM

## 2021-03-27 PROCEDURE — 85652 RBC SED RATE AUTOMATED: CPT | Performed by: STUDENT IN AN ORGANIZED HEALTH CARE EDUCATION/TRAINING PROGRAM

## 2021-03-27 PROCEDURE — 124N000002 HC R&B MH UMMC

## 2021-03-27 PROCEDURE — 82746 ASSAY OF FOLIC ACID SERUM: CPT | Performed by: STUDENT IN AN ORGANIZED HEALTH CARE EDUCATION/TRAINING PROGRAM

## 2021-03-27 PROCEDURE — 82306 VITAMIN D 25 HYDROXY: CPT | Performed by: STUDENT IN AN ORGANIZED HEALTH CARE EDUCATION/TRAINING PROGRAM

## 2021-03-27 PROCEDURE — 36415 COLL VENOUS BLD VENIPUNCTURE: CPT | Performed by: STUDENT IN AN ORGANIZED HEALTH CARE EDUCATION/TRAINING PROGRAM

## 2021-03-27 PROCEDURE — 86618 LYME DISEASE ANTIBODY: CPT | Performed by: STUDENT IN AN ORGANIZED HEALTH CARE EDUCATION/TRAINING PROGRAM

## 2021-03-27 PROCEDURE — 80061 LIPID PANEL: CPT | Performed by: STUDENT IN AN ORGANIZED HEALTH CARE EDUCATION/TRAINING PROGRAM

## 2021-03-27 PROCEDURE — 99223 1ST HOSP IP/OBS HIGH 75: CPT | Mod: AI | Performed by: PSYCHIATRY & NEUROLOGY

## 2021-03-27 PROCEDURE — 87389 HIV-1 AG W/HIV-1&-2 AB AG IA: CPT | Performed by: PSYCHIATRY & NEUROLOGY

## 2021-03-27 ASSESSMENT — ACTIVITIES OF DAILY LIVING (ADL)
LAUNDRY: WITH SUPERVISION
DRESS: INDEPENDENT
ORAL_HYGIENE: INDEPENDENT
HYGIENE/GROOMING: INDEPENDENT

## 2021-03-27 NOTE — PLAN OF CARE
"Lisa spent most of day in her room-in 1:1 emphasizes that she has been having difficulty sleeping since early March, which was aggravated by the Spring time change-when asked about thoughts of entities in her, responded her boss contacted an ex employee who is a medium, \"If you believe in that sorta stuff.\" and she did a cleansing of her-vague on the topic of entities-states she has been thinking about quitting her job for a couple of years-states she loves her work, but the days are too long-works Thursday-Sunday 10-12 HRS-states her boss encouraged her to take a leave until end of April to be sure she wants to leave-states she has been  22 yrs-   "

## 2021-03-27 NOTE — PHARMACY-ADMISSION MEDICATION HISTORY
Admission Medication History Completed by Pharmacy    See [facility] Admission Navigator for allergy information, preferred outpatient pharmacy and prior to admission medications.     Medication History Sources:     Patient (via iPad in B[facility]),  present    Additional Information:    Patient denies being prescribed any medications but states she takes several OTC supplements, see table below. Patient does not know dosage strengths of any of them.     Prior to Admission medications    Medication Sig Last Dose     NONFORMULARY Several supplements but dosage unknown (multivitamin, probiotic, omega, vitamin B, vitamin D)            Date completed: 03/26/21    Medication history completed by:   Jadyn Santo, Pharm.D., [facility]  Behavioral Health Inpatient Pharmacist  [facility] Emergency Department  Phone: *[phone] (Ascom) or [phone]
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
## 2021-03-27 NOTE — SAFE
Lisa Fontaine  March 26, 2021    Pt presents with psychosis.         Current Suicidal Ideation/Self-Injurious Concerns/Methods: None - N/A    Inappropriate Sexual Behavior: No    Aggression/Homicidal Ideation: None - N/A      For additional details see full DEC assessment.       Socorro Crooks, LICSW
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
## 2021-03-27 NOTE — PROGRESS NOTES
Lisa appeared to sleep comfortably for a total of 7 hours this night shift.  No prns or snacks given or requested.

## 2021-03-27 NOTE — ED NOTES
ED to Behavioral Floor Handoff    SITUATION  Lisa Fontaine is a 55 year old female who speaks English and lives in a home with a spouse The patient arrived in the ED by private car from home with a complaint of Hallucinations (endorsing auditory hallucination, almost like she is possesed by another person. )  .The patient's current symptoms started/worsened 2 weeks ago and during this time the symptoms have increased.   In the ED, pt was diagnosed with   Final diagnoses:   Psychosis, unspecified psychosis type (H)        Initial vitals were: BP: (!) 155/77  Pulse: 71  Temp: 98.5  F (36.9  C)  Resp: 16  SpO2: 99 %   --------  Is the patient diabetic? No   If yes, last blood glucose? --     If yes, was this treated in the ED? --  --------  Is the patient inebriated (ETOH) No or Impaired on other substances? No  MSSA done? N/A  Last MSSA score: --    Were withdrawal symptoms treated? N/A  Does the patient have a seizure history? No. If yes, date of most recent seizure--  --------  Is the patient patient experiencing suicidal ideation? denies current or recent suicidal ideation     Homicidal ideation? denies current or recent homicidal ideation or behaviors.    Self-injurious behavior/urges? denies current or recent self injurious behavior or ideation.  ------  Was pt aggressive in the ED No  Was a code called No  Is the pt now cooperative? Yes  -------  Meds given in ED: Medications - No data to display   Family present during ED course? Yes  Family currently present? Yes    BACKGROUND  Does the patient have a cognitive impairment or developmental disability? No  Allergies:   Allergies   Allergen Reactions     Nkda [No Known Drug Allergies]    .   Social demographics are   Social History     Socioeconomic History     Marital status:      Spouse name: None     Number of children: None     Years of education: None     Highest education level: None   Occupational History     None   Social Needs     Financial  resource strain: None     Food insecurity     Worry: None     Inability: None     Transportation needs     Medical: None     Non-medical: None   Tobacco Use     Smoking status: Never Smoker     Smokeless tobacco: Never Used   Substance and Sexual Activity     Alcohol use: Yes     Comment: rare     Drug use: No     Sexual activity: Yes     Partners: Male     Birth control/protection: Surgical     Comment: removal of uterus 2008   Lifestyle     Physical activity     Days per week: None     Minutes per session: None     Stress: None   Relationships     Social connections     Talks on phone: None     Gets together: None     Attends Jain service: None     Active member of club or organization: None     Attends meetings of clubs or organizations: None     Relationship status: None     Intimate partner violence     Fear of current or ex partner: None     Emotionally abused: None     Physically abused: None     Forced sexual activity: None   Other Topics Concern     Parent/sibling w/ CABG, MI or angioplasty before 65F 55M? No   Social History Narrative     None        ASSESSMENT  Labs results Labs Ordered and Resulted from Time of ED Arrival Up to the Time of Departure from the ED - No data to display   Imaging Studies: No results found for this or any previous visit (from the past 24 hour(s)).   Most recent vital signs BP (!) 155/77   Pulse 71   Temp 98.5  F (36.9  C) (Tympanic)   Resp 16   LMP 01/01/2008   SpO2 99%   Breastfeeding No    Abnormal labs/tests/findings requiring intervention:---   Pain control: pt had none  Nausea control: pt had none    RECOMMENDATION  Are any infection precautions needed (MRSA, VRE, etc.)? No If yes, what infection? --  ---  Does the patient have mobility issues? independently. If yes, what device does the pt use? ---  ---  Is patient on 72 hour hold or commitment? No If on 72 hour hold, have hold and rights been given to patient? N/A  Are admitting orders written if after 10  p.m. ?N/A  Tasks needing to be completed:---     Ava Watts, KYRA    [phone] [facility]   [phone] [facility] ED

## 2021-03-28 ENCOUNTER — APPOINTMENT (OUTPATIENT)
Dept: GENERAL RADIOLOGY | Facility: CLINIC | Age: 56
DRG: 882 | End: 2021-03-28
Payer: COMMERCIAL

## 2021-03-28 LAB — DEPRECATED CALCIDIOL+CALCIFEROL SERPL-MC: 37 UG/L (ref 20–75)

## 2021-03-28 PROCEDURE — 124N000002 HC R&B MH UMMC

## 2021-03-28 PROCEDURE — 71046 X-RAY EXAM CHEST 2 VIEWS: CPT

## 2021-03-28 PROCEDURE — 71046 X-RAY EXAM CHEST 2 VIEWS: CPT | Mod: 26 | Performed by: RADIOLOGY

## 2021-03-28 ASSESSMENT — ACTIVITIES OF DAILY LIVING (ADL)
HYGIENE/GROOMING: INDEPENDENT
DRESS: INDEPENDENT
LAUNDRY: WITH SUPERVISION
ORAL_HYGIENE: INDEPENDENT

## 2021-03-28 NOTE — PROGRESS NOTES
Lisa appeared appeared to only sleep a total of 5.25 hours this night shift.  Restless in bed and sitting quietly up in bed for parts of the night.  No prns or snacks given or requested.

## 2021-03-28 NOTE — PLAN OF CARE
"Lisa more subdued today-states she got up early and just feels quiet-states she is using time in hospital to relax and reflect on recent events-Lisa continues to express relief she has quit her job-states she worked long hours for many years without giving herself the breaks between clients that people currently take-Lisa spoke of difficulty sleeping then being anxious about going to work with insufficient sleep-working ten hrs and feeling exhausted and anxious and again being unable to sleep-regarding thoughts of entity in her states, \"Maybe that's just something I made up to comfort myself.\" or \"Maybe I had a bad reaction to all the supplements I was taking, like mushroom powder mixed with..\"-Lisa politely social with peers, but spent most of day resting in her room or [place] river-states she is reflecting on her current situation and path forward  "

## 2021-03-28 NOTE — PLAN OF CARE
Patient out in milieu much of shift, interacting appropriately with select peers.  Pt still feels there is an 'entity' inside her.   Pt pleasant on approach...  retiring early.

## 2021-03-29 VITALS
BODY MASS INDEX: 31.71 KG/M2 | DIASTOLIC BLOOD PRESSURE: 83 MMHG | OXYGEN SATURATION: 99 % | WEIGHT: 202 LBS | SYSTOLIC BLOOD PRESSURE: 124 MMHG | HEART RATE: 82 BPM | TEMPERATURE: 97.6 F | HEIGHT: 67 IN | RESPIRATION RATE: 16 BRPM

## 2021-03-29 LAB
ANA SER QL IF: NEGATIVE
CERULOPLASMIN SERPL-MCNC: 21 MG/DL (ref 20–60)
INTERPRETATION ECG - MUSE: NORMAL

## 2021-03-29 PROCEDURE — 99238 HOSP IP/OBS DSCHRG MGMT 30/<: CPT | Mod: GC | Performed by: PSYCHIATRY & NEUROLOGY

## 2021-03-29 RX ORDER — ACETAMINOPHEN 325 MG/1
650 TABLET ORAL EVERY 4 HOURS PRN
Status: DISCONTINUED | OUTPATIENT
Start: 2021-03-29 | End: 2021-03-29 | Stop reason: HOSPADM

## 2021-03-29 RX ORDER — POLYETHYLENE GLYCOL 3350 17 G/17G
17 POWDER, FOR SOLUTION ORAL DAILY PRN
Status: DISCONTINUED | OUTPATIENT
Start: 2021-03-29 | End: 2021-03-29 | Stop reason: HOSPADM

## 2021-03-29 ASSESSMENT — ACTIVITIES OF DAILY LIVING (ADL)
HYGIENE/GROOMING: INDEPENDENT
ORAL_HYGIENE: INDEPENDENT
DRESS: INDEPENDENT
LAUNDRY: WITH SUPERVISION

## 2021-03-29 NOTE — PLAN OF CARE
Lisa discharged 1445 care of  to return home-able to participate in discussion of sxs prior to admission, although uncomfortable with dx-encouraged to follow up with referrals-states she may follow up with therapist, but not MD-no medications prescribed-

## 2021-03-29 NOTE — DISCHARGE INSTRUCTIONS
Behavioral Discharge Planning and Instructions    Summary: You were admitted voluntarily on 2/9/2021 due to Psychotic Symptomology. You were treated by Dr. Chahal and discharged on 3/29/21 from [facility] to Home.    Main Diagnosis:   Psychosis, [facility]     Health Care Follow-up:     Appointment Date/Time: Friday 4/2/21 at 0930 (IN PERSON)  PCP: Maura Perry   Address: [place] [address] [address] [place]   Phone Number: [phone]      Therapy Referrals:    [facility] - [phone]    [facility] - [phone]    [facility] - [phone]    Attend all scheduled appointments with your outpatient providers. Call at least 24 hours in advance if you need to reschedule an appointment to ensure continued access to your outpatient providers.     Major Treatments, Procedures and Findings:  You were provided with: a psychiatric assessment, assessed for medical stability, medication evaluation and/or management, group therapy, milieu management.    Symptoms to Report: Feeling more aggressive, increased confusion, losing more sleep, mood getting worse, or thoughts of suicide.    Early warning signs can include: Increased depression or anxiety sleep disturbances increased thoughts or behaviors of suicide or self-harm  increased unusual thinking, such as paranoia or hearing voices.    Safety and Wellness: Take all medicines as directed. Make no changes unless your doctor suggests them. Follow treatment recommendations. Refrain from alcohol and non-prescribed drugs.  Ask your support system to help you reduce your access to items that could harm yourself or others. If there is a concern for safety, call 1.    Resources:   Crisis Intervention: 879.503.6018 or 736-588-0646 (TTY: 757.727.7266).  Call anytime for help.  [employer] [place] [employer] Mental Illness (www.mn.ramesh.org): 418.820.2988 or 573-546-6075.  Suicide Awareness Voices of Education (SAVE) (www.save.org): 888-511-SAVE  (8257)  National Suicide Prevention Line (www.mentalhealthmn.org): 953-665-PCZY (4843)  [facility] Crisis Response - Adult 394 394-6673    General Medication Instructions:     See your medication sheet(s) for instructions.     Take all medicines as directed.  Make no changes unless your doctor suggests them.     Go to all your doctor visits.    Be sure to have all your required lab tests. This way, your medicines can be refilled on time.    Do not use any drugs not prescribed by your doctor.    Avoid alcohol.    Advance Directives:   Scanned document on file with [employer]? No scanned doc  Is document scanned? No. Copy Requested.  Honoring Choices Your Rights Handout: Informed and given  Was more information offered? Pt declined    The Treatment team has appreciated the opportunity to work with you. If you have any questions or concerns about your recent admission, you can contact the unit which can receive your call 24 hours a day, 7 days a week. They will be able to get in touch with a Provider if needed. The unit number is [phone] .

## 2021-03-29 NOTE — PLAN OF CARE
Patient out in milieu, interacting appropriately with staff and peers...   cooperated with going to Xray.   Pt retired early before writer could speak with her.   But patient pleasant on approach.

## 2021-03-29 NOTE — PROGRESS NOTES
"SPIRITUAL HEALTH SERVICES  SPIRITUAL ASSESSMENT Progress Note  [facility] ([facility]) [facility] [address]     REFERRAL SOURCE: I did visit this afternoon patient Lisa per [facility]  referral. I introduced myself as the unit  and shared all the info about the [facility]. Pt was new and said, \"I am ok now comparing last Friday. I do see now some change in me and felt calm down when I get enough rest. If I do need your help I will let you know. Thank you for you stopping by.\" I did respect what pt respond and left the pt room quietly.    PLAN: I will remain open to provide spiritual care for the pt as needed.    Lakeisha Barry M.Div. ([place]), M.Th., D.Min., [facility]  Staff   Pager [phone]   "

## 2021-03-29 NOTE — DISCHARGE SUMMARY
"    Psychiatric Discharge Summary    Hospital Day #3    Lisa Fontaine MRN# [id number]   Age: 55 year old YOB: 1965     Date of Admission:  3/26/2021  Date of Discharge:  3/29/2021  Admitting Physician:  Kaycee Moran MD  Discharge Physician:  Kingsley Chahal MD         Event Leading to Hospitalization:     \"Lisa Fontaine reports that since 2 week she has been experiencing symptoms including feeling of being pushed by an external force, insomnia, feeling of a presence named Pamela inside her,  paranoid delusions, command hallucinations, thought insertion, ideas of reference, and mind reading.   She reports last being well 2 weeks ago. She attributes her symptoms & decompensation to possibly an interaction with a coworker named Pamela who has the same name as the being inside her  or \"I might just be sad and lonely\". She reports she has not been prescribed psychiatric medications.  She denies recent substance use.   She reports other current stressors including long job hours.  She denies ever seeing a psychiatrist in her life.      She reports that the entity inside her tells her there is a demon in her house trying to kill her and \"pushes\" her physically throughout the day causing unintended movements. Her primary goal for this hospitalization is \"for this being in my body to be gone\".      Per DEC Assessment:  \"55-year-old female brought to the ED by her  and a friend for mental health evaluation... Patient is calm.  Speaks at length about why she in the ED.  She does not appear distressed or anxious.  Affect within normal.  She quit her job today and was brought to the ED by her .  She has been having this experience for the last month.  \"I feel like I have an attachment inside me and I told me I was a  and could understand what it is saying and it would answer any yes or no where your body goes forward or back.  I feel the energy inside of me and around me and it actually " throws me off balance.  Earlier today I would quit my job and the how and why I would do it and I actually quit my job.  They told me that my boss Charito would be expected me to quit my job and so and so another coworker will take her job.  This inside of me is a coworker and it slipped inside of me and told me someone would take my position.  I am not sleeping at nighttime because all the activity going on.  With me it is having me do all these activities.  I am being told to run out of the room like you here a tap going into the next room and I will order it is sexual like someone pushing me forward or back yelling me to take off all my clothes.  It told me my guardian binh changed that an entity in my house.  Like a woman who is living and used to live in the house and she is still alive and will not leave the house.  This morning he hinted that it is all alive.  Usually it is all living and then it got sinister.  He told me some things about my  that he cheated on me with several people for his past jobs.  That he is having relationship with his hairdresser.  He told me that my  does not think that he will die within the next year.  It was telling me to learn all about the passwords and how to pay bills.  This Wednesday is the black Sunday, the Wednesday before Marcella went to be able to commit suicide, he suggested this will happen to me.  They are sending me information and saying what I think they say.  We were taking sad songs about suicide.  She denies use of drugs or alcohol but says that they say her water at work is tainted.  They said mugwort.  The person inside me is someone I know.  I said coworker cosmic name Jorge.  In real like that powder that Jorge gave you was tainted to.  They told me I better through it out. She feels tired woozy off balance.  They emptied the container and put something else in it.  To mess me up.  She says that her manager Charito contacted a psychic medium that  "they used to work with and that this person called her today and performed a cleansing of her and that the person saw a bad energy reptilian energy on her.  She did something where this patient moved away from me and Mya Topete was holding me into her arms.     She reports no prior mental health concerns.  She saw therapist for 1 session in 2008 after having anemia and hysterectomy.  She denies use of drugs or alcohol.  She takes vitamins and supplements.  She has been using kombucha saying she is buying from a coworker and a supplement called new chapter daily for the last month.  She lives with her  10 of 22 years.  No children.  She is working as a .\"       See Admission note by Kaycee Moran MD on 03/26/2021 for additional details.          Diagnoses:   Diagnostic Impression: Lisa Fontaine is a 55 year old  female without a past psychiatric history who presented to the ED with psychosis vs nova in the context of 2 weeks of worsening psychosis and quitting her job today. Significant symptoms include mood lability, sleep issues, psychosis and disorganization. She has not been previously psychiatrically hospitalized.  She is not currently followed by a mental health provider or psychiatrist. Current psychosocial stressors include workplace stress which she has been coping with by acting out to others.  Patient's support system includes family and peers.  Substance use does not appear to be playing a contributing role in the patient's presentation.  There is genetic loading for mood and CD. Medical history does not appear to be significant for developmental delay though there is a distant possible seizure history. The MSE is notable for rambling speech, illogical thought process, loose associations and visual hallucinations. She denies self injurious behaviors.  Her definitive diagnosis is still in evolution; differential includes brief psychotic disorder, schizophreniform " disorder, and bipolar I disorder .  Optimization of medications to target these symptom clusters in addition to evaluation of adquate outpatient supports will be targets for treatment during this admission.      Diagnosis:  Adjustment disorder with mood changes       Consults:     None         Hospital Course:   Psychiatric Course:  Lisa Fontaine was admitted to [facility] with attending Kingsley Chahal MD as a voluntary patient. She wasn't on any PTA medications and wasn't started on any new ones for her duration of stay in the hospital. First episode psychosis work up came back unremarkable with the PAULINA pending.Over the course of admission, patient slept at least 6 hours which she hadn't been able to prior to admission. She reported feeling well rested, less overwhelmed, and ready to focus on the next steps in her life. During interaction with patient, she stated that the disorganized behavior observed at admission and the feeling of having an entity inside of her was largely due to feeling overwhelmed and tired. She was able to express that it wasn't real and thinks that it was due largely to how she was feeling. She reported no suicidal thoughts or ideations and mentioned that the idea that Wednesday before Easter was the day people attempted suicide was due to thinking about a song that she heard was about suicide. Patient stated that she had no thoughts or plans to hurt herself or end her life.     At discharge, patient was stable. She declined a referral to a therapist but requested for a list of resources she could reach out to herself. We also talked extensively with patient about her current diagnosis of adjustment disorder vs nova with psychotic features that her presentation at admission had suggested. We talked about the importance of sleep and the warning signs to look out for nova.      Lisa Fontaine was discharged to home. At the time of discharge Lisa Fontaine was determined to not be a  danger to herself or others.      Primary Outpatient provider:  -Please follow up on patient's pending PAULINA result and EKG result  -Please consider further psycho education for patient on the importance of having an outpatient mental health provider (a therapist)    Medical Course:  None      Risk Assessment:  Lisa Fontaine's risk factor  for self-harm, includes work place stressors. However, risk is mitigated by absence of substance use, absence of past attempts, ability to volunteer a safety plan and history of seeking help when needed. Therefore, based on all available evidence including the factors cited above, Lisa Fontaine does not appear to be at imminent risk for self-harm, and is appropriate for outpatient level of care.     This document serves as a transfer of care to Lisa Fontaine's outpatient providers.         Discharge Medications:     Current Discharge Medication List      CONTINUE these medications which have NOT CHANGED    Details   NONFORMULARY Several supplements but dosage unknown (multivitamin, probiotic, omega, vitamin B, vitamin D)                Psychiatric Examination:   Appearance:  awake, alert and adequately groomed  Attitude:  cooperative  Eye Contact:  good  Mood:  good  Affect:  appropriate and in normal range, mood congruent and intensity is normal  Speech:  clear, coherent  Psychomotor Behavior:  no evidence of tardive dyskinesia, dystonia, or tics  Thought Process:  logical and linear  Associations:  no loose associations  Thought Content:  no evidence of suicidal ideation or homicidal ideation and no evidence of psychotic thought  Insight:  good  Judgment:  intact  Oriented to:  time, person, and place  Attention Span and Concentration:  intact  Recent and Remote Memory:  intact  Language: English language with appropriate vocabulary and syntax  Fund of Knowledge: appropriate  Muscle Strength and Tone: normal  Gait and Station: Normal         Discharge Plan:   Appointment  Date/Time: 21 at 0930 (IN PERSON)  PCP: Maura Perry   Address: [place] [address] [address]   Phone Number: [phone]       Therapy Referrals:    [facility] - [phone]    [facility] - [phone]    [facility] - [phone]    Pt seen and discussed with my attending, Kingsley Chahal MD  Agency MD Jhonatan, MPH  PGY-2 Psychiatry Resident       Attestation:   The patient has been seen and evaluated by me,  Kingsley Chahal MD. I have examined the patient today and reviewed the discharge plan with the resident and medical student. I agree with the final assessment and plan, as noted in the discharge summary. I have reviewed today's vital signs, medications, labs and imaging.  Total time discharge plannin minutes  Kingsley Chahal MD ,Ph.D.        Appendix A: All Labs This Admission:     Results for orders placed or performed during the hospital encounter of 21   XR Chest 2 Views     Status: None    Narrative    EXAM: XR CHEST 2 VW  3/28/2021 6:07 PM     HISTORY:  Evaluation of first episode psychosis       COMPARISON:  None    FINDINGS: Two views of the chest.     Trachea is midline. Cardiomediastinal silhouette is within normal  limits. No focal airspace consolidation. Flattening of the anterior  left hemidiaphragm with overlying atelectasis. No significant pleural  effusion. No pneumothorax. No acute osseous or upper abdominal  abnormality.       Impression    IMPRESSION: No focal airspace consolidation.    I have personally reviewed the examination and initial interpretation  and I agree with the findings.    DIVINA ARROYO MD   Drug abuse screen 6 urine (chem dep)     Status: None   Result Value Ref Range    Amphetamine Qual Urine Negative NEG^Negative    Barbiturates Qual Urine Negative NEG^Negative    Benzodiazepine Qual Urine Negative NEG^Negative    Cannabinoids Qual Urine Negative NEG^Negative    Cocaine Qual Urine  Negative NEG^Negative    Ethanol Qual Urine Negative NEG^Negative    Opiates Qualitative Urine Negative NEG^Negative   CBC with platelets differential     Status: None   Result Value Ref Range    WBC 7.8 4.0 - 11.0 10e9/L    RBC Count 4.88 3.8 - 5.2 10e12/L    Hemoglobin 14.7 11.7 - 15.7 g/dL    Hematocrit 43.0 35.0 - 47.0 %    MCV 88 78 - 100 fl    MCH 30.1 26.5 - 33.0 pg    MCHC 34.2 31.5 - 36.5 g/dL    RDW 12.2 10.0 - 15.0 %    Platelet Count 358 150 - 450 10e9/L    Diff Method Automated Method     % Neutrophils 57.2 %    % Lymphocytes 31.2 %    % Monocytes 9.9 %    % Eosinophils 0.6 %    % Basophils 0.8 %    % Immature Granulocytes 0.3 %    Nucleated RBCs 0 0 /100    Absolute Neutrophil 4.4 1.6 - 8.3 10e9/L    Absolute Lymphocytes 2.4 0.8 - 5.3 10e9/L    Absolute Monocytes 0.8 0.0 - 1.3 10e9/L    Absolute Eosinophils 0.1 0.0 - 0.7 10e9/L    Absolute Basophils 0.1 0.0 - 0.2 10e9/L    Abs Immature Granulocytes 0.0 0 - 0.4 10e9/L    Absolute Nucleated RBC 0.0    Comprehensive metabolic panel     Status: None   Result Value Ref Range    Sodium 136 133 - 144 mmol/L    Potassium 4.2 3.4 - 5.3 mmol/L    Chloride 101 94 - 109 mmol/L    Carbon Dioxide 29 20 - 32 mmol/L    Anion Gap 6 3 - 14 mmol/L    Glucose 89 70 - 99 mg/dL    Urea Nitrogen 8 7 - 30 mg/dL    Creatinine 0.56 0.52 - 1.04 mg/dL    GFR Estimate >90 >60 mL/min/[1.73_m2]    GFR Estimate If Black >90 >60 mL/min/[1.73_m2]    Calcium 9.4 8.5 - 10.1 mg/dL    Bilirubin Total 0.5 0.2 - 1.3 mg/dL    Albumin 4.1 3.4 - 5.0 g/dL    Protein Total 7.8 6.8 - 8.8 g/dL    Alkaline Phosphatase 84 40 - 150 U/L    ALT 24 0 - 50 U/L    AST 15 0 - 45 U/L   TSH with free T4 reflex     Status: None   Result Value Ref Range    TSH 1.00 0.40 - 4.00 mU/L   Asymptomatic SARS-CoV-2 COVID-19 Virus (Coronavirus) by PCR     Status: None    Specimen: Nasopharyngeal   Result Value Ref Range    SARS-CoV-2 Virus Specimen Source Nasopharyngeal     SARS-CoV-2 PCR Result NEGATIVE     SARS-CoV-2  PCR Comment (Note)    Lipid panel     Status: None   Result Value Ref Range    Cholesterol 189 <200 mg/dL    Triglycerides 51 <150 mg/dL    HDL Cholesterol 82 >49 mg/dL    LDL Cholesterol Calculated 97 <100 mg/dL    Non HDL Cholesterol 107 <130 mg/dL   Vitamin B12     Status: Abnormal   Result Value Ref Range    Vitamin B12 1,512 (H) 193 - 986 pg/mL   Vitamin D     Status: None   Result Value Ref Range    Vitamin D Deficiency screening 37 20 - 75 ug/L   Folate     Status: None   Result Value Ref Range    Folate 42.8 >5.4 ng/mL   Treponema Abs w Reflex to RPR and Titer     Status: None   Result Value Ref Range    Treponema Antibodies Nonreactive NR^Nonreactive   Lyme Disease Lyric with reflex to WB Serum     Status: None   Result Value Ref Range    Lyme Disease Antibodies Serum 0.22 0.00 - 0.89   Erythrocyte sedimentation rate auto     Status: None   Result Value Ref Range    Sed Rate 8 0 - 30 mm/h   Ceruloplasmin     Status: None   Result Value Ref Range    Ceruloplasmin 21 20 - 60 mg/dL   HIV Antigen Antibody Combo     Status: None   Result Value Ref Range    HIV Antigen Antibody Combo Nonreactive NR^Nonreactive       EKG 12-lead, tracing only     Status: None   Result Value Ref Range    Interpretation ECG Click View Image link to view waveform and result

## 2021-03-29 NOTE — PLAN OF CARE
Lisa this AM standing in room, against near wall, where she cannot be observed by staff with sheet wrapped around her and no observable clothing underneath for approx one hour-states she is just looking out window-

## 2021-03-29 NOTE — PLAN OF CARE
BEHAVIORAL TEAM DISCUSSION    Participants:   Dr. Kingsley Chahal, Attending Psychiatrist  Tyler Israel, PGY1  Kt Tobar, PGY1  Erin Prasad, RN, Crystal Sams, KYRA Eric, [facility], [facility], [facility]  Progress: Initial  Anticipated length of stay: Unknown, pending stabilization and appropriate disposition plan.   Continued Stay Criteria/Rationale: Psychosis  Medical/Physical: No acute issues - was cleared by ED for psychiatric admission  Precautions:   Behavioral Orders   Procedures     Code 1 - Restrict to Unit     Code 2     For imaging only     Routine Programming     As clinically indicated     Status 15     Every 15 minutes.     Suicide precautions     Patients on Suicide Precautions should have a Combination Diet ordered that includes a Diet selection(s) AND a Behavioral Tray selection for Safe Tray - with utensils, or Safe Tray - NO utensils       Plan: The plan is to assess and patient for mental health and medication needs.  The patient will be prescribed medications to treat the identified symptoms.  Upon discharge the patient will be referred to services as appropriate based on the assessment.   Rationale for change in precautions or plan: No change at present-will continue to monitor and adjust as needed.

## 2021-03-29 NOTE — PROGRESS NOTES
Lisa appeared to sleep a total of 6.25 hours this night shift.  Appeared comfortable.  No snacks or prns given or received.

## 2021-04-01 ENCOUNTER — PATIENT OUTREACH (OUTPATIENT)
Dept: CARE COORDINATION | Facility: CLINIC | Age: 56
End: 2021-04-01

## 2021-04-01 NOTE — LETTER
"[facility] CARE COORDINATION  April 1, 2021    Lisa Fontaine  [address] [address]      Dear Lisa,    I am a clinical product navigator that works on behalf of [facility]; I wanted to introduce myself and role to you, as I can help you establish care with recommended providers for ongoing care within our health care system.     This role serves as a liaison between [facility] s clinical network and the health insurance plans to provide guidance on establishing primary and specialty care needs. One of the benefits of having a primary care provider from the network is that your care team will help coordinate your care and guide you through any additional care you may need. Our care team is focused and trained to treat the whole person and can help you with all your physical, emotional, and social concerns.     Also, our Primary Care Clinics are all supported by Clinic Care Coordination:     \"The clinic care coordinator is a registered nurse and/or  who understand the health care system. The goal of clinic care coordination is to help you manage your health and improve access to the [facility] in the most efficient manner. The registered nurse can assist you in meeting your health care goals by providing education, coordinating services, and strengthening the communication among your providers. The  can assist you with financial, behavioral, psychosocial, chemical dependency, counseling, and/or psychiatric resources.\"    I look forward to helping you connect with providers within our health care system; please let me know if you have any questions.     Sincerely,    Sherita Diallo RN   Clinical Product Navigator  PH: [phone]        "

## 2021-04-01 NOTE — PROGRESS NOTES
Clinic Care Coordination Contact    Clinical Product Navigator RN reviewed chart; patient on payer product coverage.  Review results: recent admission related to mental health concerns; patient has cancelled her scheduled hospital follow up appointment with PCP, no future visits scheduled.     Will outreach and offer care navigation assistance.       [facility]/Voicemail    Referral Source: Health Plan  Clinical Data: Care Coordinator Outreach  Outreach attempted x 1.  Left message on patient's voicemail with call back information and requested return call.  Plan: Care Coordinator will send care coordination introduction letter with care coordinator contact information and explanation of care coordination services via mail. Care Coordinator will do no further outreaches at this time.      Sherita Diallo RN/Clinical Product Navigator

## 2021-04-06 ENCOUNTER — AMBULATORY - HEALTHEAST (OUTPATIENT)
Dept: CARE COORDINATION | Facility: HOSPITAL | Age: 56
End: 2021-04-06

## 2021-04-15 ENCOUNTER — COMMUNICATION - HEALTHEAST (OUTPATIENT)
Dept: BEHAVIORAL HEALTH | Facility: CLINIC | Age: 56
End: 2021-04-15

## 2021-04-16 ENCOUNTER — OFFICE VISIT - HEALTHEAST (OUTPATIENT)
Dept: BEHAVIORAL HEALTH | Facility: CLINIC | Age: 56
End: 2021-04-16

## 2021-04-16 DIAGNOSIS — F29 PSYCHOSIS, UNSPECIFIED PSYCHOSIS TYPE (H): ICD-10-CM

## 2021-04-20 ENCOUNTER — TRANSFERRED RECORDS (OUTPATIENT)
Dept: HEALTH INFORMATION MANAGEMENT | Facility: CLINIC | Age: 56
End: 2021-04-20
Payer: COMMERCIAL

## 2021-04-21 ENCOUNTER — PATIENT OUTREACH (OUTPATIENT)
Dept: CARE COORDINATION | Facility: CLINIC | Age: 56
End: 2021-04-21

## 2021-04-21 DIAGNOSIS — F29 PSYCHOSIS (H): Primary | ICD-10-CM

## 2021-04-22 ENCOUNTER — PATIENT OUTREACH (OUTPATIENT)
Dept: CARE COORDINATION | Facility: CLINIC | Age: 56
End: 2021-04-22

## 2021-04-22 NOTE — LETTER
[facility] CARE COORDINATION  [address] [address]    April 22, 2021    Lisa Fontaine  [address] [address]      Dear Lisa,    I am a clinic community health worker who works with Maura Perry MD at [facility]. I wanted to thank you for spending the time to talk with me.  Below is a description of clinic care coordination and how I can further assist you.      The clinic care coordination team is made up of a registered nurse,  and community health worker who understand the health care system. The goal of clinic care coordination is to help you manage your health and improve access to the health care system in the most efficient manner. The team can assist you in meeting your health care goals by providing education, coordinating services, strengthening the communication among your providers and supporting you with any resource needs.    Please feel free to contact me at [phone] with any questions or concerns. We are focused on providing you with the highest-quality healthcare experience possible and that all starts with you.     Sincerely,     COLIN English  Clinic Care Coordination   [phone]  [email or web address]

## 2021-04-22 NOTE — PROGRESS NOTES
Clinic Care Coordination Contact  [facility]/Voicemail    Clinical Data: Care Coordinator Outreach  Outreach attempted x 1.  Left message on patient's voicemail with call back information and requested return call.  Plan: Care Coordinator will try to reach patient again in 1-2 business days.

## 2021-04-22 NOTE — PROGRESS NOTES
Clinic Care Coordination Contact  Community Health Worker Initial Outreach    Patient accepts CC: No, not at this time. Patient will be sent Care Coordination introduction letter for future reference.

## 2021-04-30 ENCOUNTER — OFFICE VISIT (OUTPATIENT)
Dept: FAMILY MEDICINE | Facility: CLINIC | Age: 56
End: 2021-04-30
Payer: COMMERCIAL

## 2021-04-30 VITALS
DIASTOLIC BLOOD PRESSURE: 80 MMHG | HEIGHT: 66 IN | SYSTOLIC BLOOD PRESSURE: 126 MMHG | BODY MASS INDEX: 30.5 KG/M2 | HEART RATE: 79 BPM | WEIGHT: 189.8 LBS | TEMPERATURE: 96.7 F

## 2021-04-30 DIAGNOSIS — Z12.31 VISIT FOR SCREENING MAMMOGRAM: ICD-10-CM

## 2021-04-30 DIAGNOSIS — Z12.4 SCREENING FOR CERVICAL CANCER: ICD-10-CM

## 2021-04-30 DIAGNOSIS — Z13.1 SCREENING FOR DIABETES MELLITUS: ICD-10-CM

## 2021-04-30 DIAGNOSIS — Z13.220 SCREENING FOR LIPID DISORDERS: ICD-10-CM

## 2021-04-30 DIAGNOSIS — F29 PSYCHOSIS, UNSPECIFIED PSYCHOSIS TYPE (H): ICD-10-CM

## 2021-04-30 DIAGNOSIS — Z00.01 ENCOUNTER FOR ROUTINE ADULT MEDICAL EXAM WITH ABNORMAL FINDINGS: Primary | ICD-10-CM

## 2021-04-30 LAB
CHOLEST SERPL-MCNC: 203 MG/DL
GLUCOSE SERPL-MCNC: 97 MG/DL (ref 70–99)
HDLC SERPL-MCNC: 87 MG/DL
LDLC SERPL CALC-MCNC: 108 MG/DL
NONHDLC SERPL-MCNC: 116 MG/DL
TRIGL SERPL-MCNC: 42 MG/DL

## 2021-04-30 PROCEDURE — 36415 COLL VENOUS BLD VENIPUNCTURE: CPT | Performed by: FAMILY MEDICINE

## 2021-04-30 PROCEDURE — 99396 PREV VISIT EST AGE 40-64: CPT | Performed by: FAMILY MEDICINE

## 2021-04-30 PROCEDURE — 80061 LIPID PANEL: CPT | Performed by: FAMILY MEDICINE

## 2021-04-30 PROCEDURE — 87624 HPV HI-RISK TYP POOLED RSLT: CPT | Performed by: FAMILY MEDICINE

## 2021-04-30 PROCEDURE — 82947 ASSAY GLUCOSE BLOOD QUANT: CPT | Performed by: FAMILY MEDICINE

## 2021-04-30 PROCEDURE — G0145 SCR C/V CYTO,THINLAYER,RESCR: HCPCS | Performed by: FAMILY MEDICINE

## 2021-04-30 PROCEDURE — 99213 OFFICE O/P EST LOW 20 MIN: CPT | Mod: 25 | Performed by: FAMILY MEDICINE

## 2021-04-30 RX ORDER — OLANZAPINE 10 MG/1
10 TABLET ORAL
COMMUNITY
Start: 2021-04-15 | End: 2021-05-07

## 2021-04-30 ASSESSMENT — ANXIETY QUESTIONNAIRES
7. FEELING AFRAID AS IF SOMETHING AWFUL MIGHT HAPPEN: NOT AT ALL
3. WORRYING TOO MUCH ABOUT DIFFERENT THINGS: NOT AT ALL
6. BECOMING EASILY ANNOYED OR IRRITABLE: NOT AT ALL
5. BEING SO RESTLESS THAT IT IS HARD TO SIT STILL: NOT AT ALL
2. NOT BEING ABLE TO STOP OR CONTROL WORRYING: NOT AT ALL
GAD7 TOTAL SCORE: 0
1. FEELING NERVOUS, ANXIOUS, OR ON EDGE: NOT AT ALL

## 2021-04-30 ASSESSMENT — PATIENT HEALTH QUESTIONNAIRE - PHQ9
SUM OF ALL RESPONSES TO PHQ QUESTIONS 1-9: 1
5. POOR APPETITE OR OVEREATING: NOT AT ALL

## 2021-04-30 ASSESSMENT — MIFFLIN-ST. JEOR: SCORE: 1481.65

## 2021-04-30 NOTE — PROGRESS NOTES
SUBJECTIVE:   CC: Lisa Fontaine is an 55 year old woman who presents for preventive health visit.       Patient has been advised of split billing requirements and indicates understanding: Yes  Healthy Habits:    Do you get at least three servings of calcium containing foods daily (dairy, green leafy vegetables, etc.)? yes    Amount of exercise or daily activities, outside of work: 3 day(s) per week    Problems taking medications regularly No    Medication side effects: No    Have you had an eye exam in the past two years? yes    Do you see a dentist twice per year? yes    Do you have sleep apnea, excessive snoring or daytime drowsiness?no        Two recent hospitalizations for psychosis - new diagnosis  3/26-3/29/21 in [place]  - see epic chart   4/6/21 - 4/15/21 was on a hold  - see epic chart     4/19/21  evisit with [place]   4/20/21 - [facility] - video appointment - increased dose of zyprexa to 15mg daily   Dr Butler    Switching to cobra for 18months       Today's PHQ-2 Score: 0  PHQ-2 ( 1999 Pf[employer]) 4/30/2021 3/20/2018   Q1: Little interest or pleasure in doing things 0 0   Q2: Feeling down, depressed or hopeless 0 0   PHQ-2 Score 0 0       Abuse: Current or Past(Physical, Sexual or Emotional)- No  Do you feel safe in your environment? Yes    Have you ever done Advance Care Planning? (For example, a Health Directive, POLST, or a discussion with a medical provider or your loved ones about your wishes): Yes, patient states has an Advance Care Planning document and will bring a copy to the clinic.    Social History     Tobacco Use     Smoking status: Never Smoker     Smokeless tobacco: Never Used   Substance Use Topics     Alcohol use: Yes     Comment: rare     If you drink alcohol do you typically have >3 drinks per day or >7 drinks per week? No                     Reviewed orders with patient.  Reviewed health maintenance and updated orders accordingly - Yes  Labs reviewed in [facility]    FSH-7:  "No flowsheet data found.    .  Pertinent mammograms are reviewed under the imaging tab.    Pertinent mammograms are reviewed under the imaging tab.  History of abnormal Pap smear:   PAP / HPV Latest Ref Rng & Units 3/9/2016 4/4/2012   PAP - NIL NIL   HPV 16 DNA NEG Negative -   HPV 18 DNA NEG Negative -   OTHER HR HPV NEG Negative -     Reviewed and updated as needed this visit by clinical staff                 Reviewed and updated as needed this visit by Provider                  ROS:  CONSTITUTIONAL: NEGATIVE for fever, chills, change in weight  INTEGUMENTARU/SKIN: NEGATIVE for worrisome rashes, moles or lesions  EYES: NEGATIVE for vision changes or irritation  ENT: NEGATIVE for ear, mouth and throat problems  RESP: NEGATIVE for significant cough or SOB  BREAST: NEGATIVE for masses, tenderness or discharge  CV: NEGATIVE for chest pain, palpitations or peripheral edema  GI: NEGATIVE for nausea, abdominal pain, heartburn, or change in bowel habits  : NEGATIVE for unusual urinary or vaginal symptoms. Periods are regular.  MUSCULOSKELETAL: NEGATIVE for significant arthralgias or myalgia  NEURO: NEGATIVE for weakness, dizziness or paresthesias  PSYCHIATRIC: NEGATIVE for changes in mood or affect    OBJECTIVE:   /80   Pulse 79   Temp 96.7  F (35.9  C) (Tympanic)   Ht 1.683 m (5' 6.25\")   Wt 86.1 kg (189 lb 12.8 oz)   LMP 01/01/2008   BMI 30.40 kg/m    EXAM:  GENERAL: healthy, alert and no distress  EYES: Eyes grossly normal to inspection, PERRL and conjunctivae and sclerae normal  NECK: no adenopathy, no asymmetry, masses, or scars and thyroid normal to palpation  RESP: lungs clear to auscultation - no rales, rhonchi or wheezes  BREAST: normal without masses, tenderness or nipple discharge and no palpable axillary masses or adenopathy  CV: regular rate and rhythm, normal S1 S2, no S3 or S4, no murmur, click or rub, no peripheral edema and peripheral pulses strong  ABDOMEN: soft, nontender, no " "hepatosplenomegaly, no masses and bowel sounds normal  MS: no gross musculoskeletal defects noted, no edema  NEURO: Normal strength and tone, mentation intact and speech normal  PSYCH: mentation appears normal, affect normal/bright    Diagnostic Test Results:  Labs reviewed in Epic    ASSESSMENT/PLAN:   (Z00.01) Encounter for routine adult medical exam with abnormal findings  (primary encounter diagnosis)  Comment: We discussed self breas exams, exercise 30mins/day, and calcium with vitamin D at 1200mg/day, preferably from dietary sources.  Diet, Weight loss, and Exercise were discussed as well.         (F29) Psychosis, unspecified psychosis type (H)  Comment: reviewed her recent hospitalizations. Discussed with her.   Needs to stay on the zyprexa and continue to see the psychiatrist.   [facility]        (Z12.31) Visit for screening mammogram  Comment:   Plan:     (Z12.4) Screening for cervical cancer  Comment:   Plan: Pap imaged thin layer screen with HPV -         recommended age 30 - 65, HPV High Risk Types         DNA Cervical            (Z13.1) Screening for diabetes mellitus  Comment:   Plan: Glucose            (Z13.220) Screening for lipid disorders  Comment:   Plan: Lipid panel reflex to direct LDL Fasting              Patient has been advised of split billing requirements and indicates understanding: Yes  COUNSELING:   Reviewed preventive health counseling, as reflected in patient instructions       Regular exercise       Healthy diet/nutrition    Estimated body mass index is 31.64 kg/m  as calculated from the following:    Height as of 3/26/21: 1.702 m (5' 7\").    Weight as of 3/26/21: 91.6 kg (202 lb).      She reports that she has never smoked. She has never used smokeless tobacco.      Counseling Resources:  ATP IV Guidelines  Pooled Cohorts Equation Calculator  Breast Cancer Risk Calculator  BRCA-Related Cancer Risk Assessment: FHS-7 Tool  FRAX Risk Assessment  ICSI Preventive " Guidelines  Dietary Guidelines for Americans, 2010  USDA's MyPlate  ASA Prophylaxis  Lung CA Screening    Maura Perry MD  [facility]

## 2021-04-30 NOTE — PATIENT INSTRUCTIONS
On Tuesday, May 4th, call [phone] to schedule a J&J vaccination at the [facility]        Preventive Health Recommendations  Female Ages 50 - 64    Yearly exam: See your health care provider every year in order to  o Review health changes.   o Discuss preventive care.    o Review your medicines if your doctor has prescribed any.      Get a Pap test every three years (unless you have an abnormal result and your provider advises testing more often).    If you get Pap tests with HPV test, you only need to test every 5 years, unless you have an abnormal result.     You do not need a Pap test if your uterus was removed (hysterectomy) and you have not had cancer.    You should be tested each year for STDs (sexually transmitted diseases) if you're at risk.     Have a mammogram every 1 to 2 years.    Have a colonoscopy at age 50, or have a yearly FIT test (stool test). These exams screen for colon cancer.      Have a cholesterol test every 5 years, or more often if advised.    Have a diabetes test (fasting glucose) every three years. If you are at risk for diabetes, you should have this test more often.     If you are at risk for osteoporosis (brittle bone disease), think about having a bone density scan (DEXA).    Shots: Get a flu shot each year. Get a tetanus shot every 10 years.    Nutrition:     Eat at least 5 servings of fruits and vegetables each day.    Eat whole-grain bread, whole-wheat pasta and brown rice instead of white grains and rice.    Get adequate Calcium and Vitamin D.     Lifestyle    Exercise at least 150 minutes a week (30 minutes a day, 5 days a week). This will help you control your weight and prevent disease.    Limit alcohol to one drink per day.    No smoking.     Wear sunscreen to prevent skin cancer.     See your dentist every six months for an exam and cleaning.    See your eye doctor every 1 to 2 years.

## 2021-05-01 ASSESSMENT — ANXIETY QUESTIONNAIRES: GAD7 TOTAL SCORE: 0

## 2021-05-05 LAB
COPATH REPORT: NORMAL
PAP: NORMAL

## 2021-05-06 ENCOUNTER — TELEPHONE (OUTPATIENT)
Dept: FAMILY MEDICINE | Facility: CLINIC | Age: 56
End: 2021-05-06

## 2021-05-06 LAB
FINAL DIAGNOSIS: NORMAL
HPV HR 12 DNA CVX QL NAA+PROBE: NEGATIVE
HPV16 DNA SPEC QL NAA+PROBE: NEGATIVE
HPV18 DNA SPEC QL NAA+PROBE: NEGATIVE
SPECIMEN DESCRIPTION: NORMAL
SPECIMEN SOURCE CVX/VAG CYTO: NORMAL

## 2021-05-06 NOTE — TELEPHONE ENCOUNTER
Called patient LM to [facility] we need information about what [facility] she goes to so we can get the records.      Carmela Handy, [place]

## 2021-05-07 ENCOUNTER — OFFICE VISIT (OUTPATIENT)
Dept: FAMILY MEDICINE | Facility: CLINIC | Age: 56
End: 2021-05-07
Payer: COMMERCIAL

## 2021-05-07 VITALS
SYSTOLIC BLOOD PRESSURE: 114 MMHG | WEIGHT: 187.2 LBS | BODY MASS INDEX: 30.08 KG/M2 | HEART RATE: 74 BPM | TEMPERATURE: 96.7 F | HEIGHT: 66 IN | DIASTOLIC BLOOD PRESSURE: 79 MMHG

## 2021-05-07 DIAGNOSIS — Z12.31 ENCOUNTER FOR SCREENING MAMMOGRAM FOR BREAST CANCER: ICD-10-CM

## 2021-05-07 DIAGNOSIS — F29 PSYCHOSIS, UNSPECIFIED PSYCHOSIS TYPE (H): Primary | ICD-10-CM

## 2021-05-07 PROBLEM — R87.610 ASCUS OF CERVIX WITH NEGATIVE HIGH RISK HPV: Status: RESOLVED | Noted: 2021-05-07 | Resolved: 2021-05-07

## 2021-05-07 PROBLEM — R87.610 ASCUS OF CERVIX WITH NEGATIVE HIGH RISK HPV: Status: ACTIVE | Noted: 2021-05-07

## 2021-05-07 PROCEDURE — 99214 OFFICE O/P EST MOD 30 MIN: CPT | Performed by: FAMILY MEDICINE

## 2021-05-07 RX ORDER — OLANZAPINE 10 MG/1
10 TABLET ORAL AT BEDTIME
Qty: 30 TABLET | Refills: 0 | Status: SHIPPED | OUTPATIENT
Start: 2021-05-07 | End: 2021-08-27

## 2021-05-07 ASSESSMENT — ANXIETY QUESTIONNAIRES
5. BEING SO RESTLESS THAT IT IS HARD TO SIT STILL: NOT AT ALL
7. FEELING AFRAID AS IF SOMETHING AWFUL MIGHT HAPPEN: NOT AT ALL
GAD7 TOTAL SCORE: 0
3. WORRYING TOO MUCH ABOUT DIFFERENT THINGS: NOT AT ALL
1. FEELING NERVOUS, ANXIOUS, OR ON EDGE: NOT AT ALL
6. BECOMING EASILY ANNOYED OR IRRITABLE: NOT AT ALL
2. NOT BEING ABLE TO STOP OR CONTROL WORRYING: NOT AT ALL

## 2021-05-07 ASSESSMENT — PATIENT HEALTH QUESTIONNAIRE - PHQ9
5. POOR APPETITE OR OVEREATING: NOT AT ALL
SUM OF ALL RESPONSES TO PHQ QUESTIONS 1-9: 2

## 2021-05-07 ASSESSMENT — MIFFLIN-ST. JEOR: SCORE: 1469.85

## 2021-05-08 ASSESSMENT — ANXIETY QUESTIONNAIRES: GAD7 TOTAL SCORE: 0

## 2021-06-16 NOTE — TELEPHONE ENCOUNTER
TRANSITION CLINIC PHONE SCREENING  REFERRAL NAME: Marycruz Evans  REFERRAL CONTACT: [phone]  PATIENT CELL PHONE: [phone]  REASON FOR REFERRAL: Sx management; check-in regarding medication compliance  NEXT LEVEL OF CARE THAT THE PATIENT WILL BE TRANSITIONING TO: Psychiatry with [facility] Dr. Luke Zaldivar  START DATE OF NEXT LEVEL OF CARE: 04/20/21  ANTICIPATED FREQUENCY OF TREATMENT: 1-2 times before she starts psychiatry  ANTICIPATED LENGTH OF TREATMENT: 1-2 times before 04/20/21  CLINICAL ASSESSMENT COMPLETED: (Crisis or DA): Yes  DATE CLINICAL ASSESSMENT WAS COMPLETED: 04/06/2021  DIAGNOSIS: Psychosis unspecified  DIAGNOSTICS CODES: F29  PRESENTING CONCERN(S): AH/VH; nova  SAFETY CONCERNS (SI/HI/SIB): No  SUBSTANCE USE (current/past): none reported  MEDICATION COMPLIANCE: has been hesitant to try medications so compliance is unknown   NEEDS: none  ACCESS TO TECHNOLOGY: yes but prefers a phone call visit  CRITERIA MET FOR TRANSITION CLINIC: Yes  TRANSITION CLINIC APPOINTMENT SCHEDULED: 04/16/21 @ 1400

## 2021-06-16 NOTE — PROGRESS NOTES
Mental Health Psychotherapy Note    Patient: Lisa Fontaine    : 1965 MRN: [id number]    2021    Start time: 158pm    Stop Time: 234pm   Session # Initial (1)    Completed 2 point verification; patient verbally provided full name and date of birth.      Session Type: Patient is presenting for an Individual session.    Lisa Fontaine is a 55 y.o. female is being seen today for symptoms management and medication compliance, recently discharged from inpatient hospitalization.     Telemedicine Visit: The patient's condition can be safely assessed and treated via synchronous audio and audio telemedicine encounter.      Reason for Telemedicine Visit: Patient has requested telehealth visit    Originating Site (Patient Location): Patient's home    Distant Site (Provider Location): Remote Setting    Consent:  The patient/guardian has verbally consented to: the potential risks and benefits of telemedicine (audio visit) versus in person care; bill my insurance or make self-payment for services provided; and responsibility for payment of non-covered services.     Mode of Communication:  Audio Conference via Phone    As the provider I attest to compliance with applicable laws and regulations related to telemedicine.    Those present for this visit patient and self.    Follow up in regards to ongoing symptom management of psychosis unspecified F29    New symptoms or complaints: None    Functional Impairment:   Personal: 2  Family: 3  Social: 2  Work: 0 - not currently working.    Clinical assessment of mental status:   Lisa Fontaine presented on time.   She was oriented x3, open and cooperative, and dressed appropriately for this session and weather. Her memory was Normal cognitive functioning .  Her speech was  Within normal.  Language was normal, coherent and clear.  Concentration and focus is Within normal. Psychosis is not noted or reported. She reports her mood is Congruent w/content of speech.  Affect is  congruent with speech and is Congruent w/content of speech.  Fund of knowledge is adequate. Insight is adequate for therapy.    ASSESSMENT: Current Emotional / Mental Status (status of significant symptoms):              Risk status (Self / Other harm or suicidal ideation)              Patient denied having concerns for personal safety.              Patient denies current or recent suicidal ideation or behaviors.              Patient denies current or recent homicidal ideation or behaviors.              Patient denies current or recent self injurious behavior or ideation.              Patient denies other safety concerns.              Patient dicussed risk factor of stress, changes, and current life transitions that she feels were contributing to her mental health symptoms.              Patient is using meditation, wellness coping skills, and spending time with her  as protective factors.              Recommended that patient call 911 or go to the local ED should there be a change in any of these risk factors.    Patient's impression of their current status: Patient states today is a  good day.   Patient discharged home from inpatient hospitalization yesterday, and states she has enjoyed returning to daily routines/tasks.  Patient has been outside to enjoy the sun today, done laundry, made the bed, making food, etc.  Patient s  works from home and she states today is the  first time in a long time we have just hungout, its been good.   Patient states she is  happy to be home.   Patient states she talked to her  several times a day while hospitalized and she feels his support.  Patient states she feels her inpatient hospitalization was helpful to learn coping skills, start medication, participate in group, and be in a different environment.  Patient denied experiencing psychosis symptoms today, states she is more anxious as she feels she is in a  transition  and  worried about the future.   She  recently has stopped working and doesn t plan to return.  She carried her and  on insurance, and so new insurance/costs/finances are an ongoing concern as well.  Patient feels these transitions and stressors contributed to her mental health symptoms and inpatient hospitalizations.  Patient had questions regarding her intake psychiatry appointment Tuesday 4/20/2021 with [facility].  Questions were related to role of psychiatry, frequency of appointments, expectations, etc.  Patient in the future is hopeful to decrease medication and eventually no longer need medication.  Patient is aware it is recommended she discuss topics related to medication management (side effects, thoughts, concerns, dose changes, etc.) with ongoing psychiatry.  Patient is not interested in ongoing therapy at this time, due to concerns with finances/insurance changes/cost.    Therapist impression of patients current state: This 55 y.o. White or  female presents with attending appointment for symptom management and medication compliance following inpatient hospitalization.  Per chart review, patient has new onset of psychosis symptoms that have resulted in recent inpatient hospitalizations.  Patient was hospitalized at [place] 3/26/2021-3/29/2021 and at [facility] 4/6/2021-4/15/2021.  Patient had hesitation and was intimidated by starting medications, but discharging from [facility] patient states she has been medication compliant with taking Zyprexa before bedtime.  Patient states she was and still is anxious to be on medication, but is getting used to it.  Patient does feel the medication has been helpful in managing her symptoms.  Per chart review Psychiatry notes and Crisis Assessments, patient was demonstrating good insight regarding her symptoms states that these are intermittent and come and go.  Reports that they consist of hallucinations both auditory and visual.  Also stated that she  "is experiencing thought insertion and broadcast.  Reports that the hallucinations are \"suggestions\" started in early March all of a sudden when she woke up.  Patient denied experiencing psychosis symptoms today and patient was lucid throughout the appointment.  Patient has been using wellness techniques and meditation as coping skills.  Patient has intake psychiatry appointment Tuesday 4/20/2021 with [facility].  Patient feels she doesn t need to schedule a follow up Transition Clinic Appointment, she does appreciate today s appointment following inpatient hospitalization and as a transition to ongoing psychiatry to manage her medication.      The author of this note documented a reason for not sharing it with the patient.  Reason: Psychotherapy process note.      Type of psychotherapeutic technique provided: Insight oriented and Client centered      Progress toward short term goals: patient taking medications as prescribed, using coping skills/techniques learned during inpatient hospitalization, intake psychiatry appointment Tuesday 4/20/2021 with [facility].      Review of long term goals: Not done at today's visit .       Diagnosis: Psychosis Unspecified. F29      Plan and Follow-up: patient taking medications as prescribed, using coping skills/techniques learned during inpatient hospitalization, intake psychiatry appointment Tuesday 4/20/2021 with [facility].      Discharge Criteria/Planning: Patient feels she doesn t need to schedule a follow up Transition Clinic Appointment, she does appreciate today s appointment following inpatient hospitalization and as a transition to ongoing psychiatry to manage her medication.        I have reviewed the note as documented above.  This accurately captures the substance of my conversation with the patient.    As the provider I attest to compliance with applicable laws and regulations related to telemedicine.  Performed and documented by "      4/16/2021   Manuela Ruvalcaba [facility]  ER Crisis Social Work  3:20 PM

## 2021-06-16 NOTE — TELEPHONE ENCOUNTER
Who is calling?Marycruz Evans  Where are they calling from?[facility]  Best contact number: [phone]  Best time to call back: ASAP  Is there additional clinical information that is not available in Epic that needs to be sent over? No

## 2021-06-16 NOTE — PROGRESS NOTES
S Pt is a 55 year old female at the [facility] ED    B Pt bib by EMS. Pt recently hospitalized at [facility] and discharged 3/29 from 22 (no current bed available on [facility]). Pt has a history of adjustment disorder with mood changes vs nova with psychosis on discharge. Pt in ed is tangential, responding to internal stimuli and hyper focused on random things like drinking oj at 6am because she might have epilepsy (pt does not have epilepsy per chart review at [facility]). Pt is communicating AH with a voice in her head. Pt also has VH of coworkers. Pt also delusional and says  phone gives out info to random people and that she has been stealing from work and will be arrested. Pt has not been sleeping. Pt also says the voices talk about suicide and play lyrics in her head about suicide. COVID19 negative.    A 72hh started 4/6/ 313am    R Placed adult worklist    /80  P 105  SPO2 97  R 16    - per provider CBC, CMP,UTOX due to pt age and clinical presentation. Also additionally provider would like to know pt intake while in ed  - per [facility] worker 151pm pt has eaten today and drank fluids in ed. Pt did eat all of her breakfast this morning and drink fluids  - 153pm intake awaiting labs from [facility] and holding bed for tentative admission  - passed to junior shift to follow lab work and call provider after results before proceeding with admission per provider request

## 2021-06-16 NOTE — PROGRESS NOTES
R:3:28pm- Intake left voicemail for provider  4:17pm-intake reviewed labs with provider. Provider accepted  4500/[place]  4:24pm: Pt placed in queue  4:42pm-Intake faxed ppwrk to unit  5:12pm- ED notified

## 2021-08-16 ENCOUNTER — PATIENT OUTREACH (OUTPATIENT)
Dept: CARE COORDINATION | Facility: CLINIC | Age: 56
End: 2021-08-16

## 2021-08-16 ENCOUNTER — PATIENT OUTREACH (OUTPATIENT)
Dept: NURSING | Facility: CLINIC | Age: 56
End: 2021-08-16

## 2021-08-16 DIAGNOSIS — F29 PSYCHOSIS, UNSPECIFIED PSYCHOSIS TYPE (H): Primary | ICD-10-CM

## 2021-08-16 NOTE — PROGRESS NOTES
Clinic Care Coordination Contact  Community Health Worker Initial Outreach    Patient accepts CC: No, not at this time. Patient will be sent Care Coordination introduction letter for future reference.     [facility]: Post-Discharge Note  SITUATION                                                      Admission:    Admission Date: 08/03/21   Reason for Admission: Psychosis, unspecified psychosis type  Discharge:   Discharge Date: 08/05/21  Discharge Diagnosis: Unspecified psychosis (eval for schizophreniform d/o)  Discharge Plan: Condition at discharge: poor.Discharge destination: homeLabs or testing which should be done or considered in the outpatient setting: Routine SGA lab monitoring and periodic AIMSAppointments and Follow Up: Discharge Orders (Non-med) Behavioral Health Follow-Ups Comments: Date: 01 September 2021 Time: 10:00AM Provider: Dr Yesi Schultz MD [facility] [address] [place] [address]  Phone: [phone] is a telephone appointment. If you have questions or need to change or cancel this appointment, please ring the above number at least 24 hours prior to your scheduled appointment time. Failure to keep appointments may result in the clinic declining to schedule additional appointments in the future. References:&nbsp;&nbsp;&nbsp;&nbsp;Specialty Connection Question Answer Comment Appointment Urgency? Non-Urgent Reason for visit? Psychiatry    BACKGROUND                                                      Initial Assessment:  Lisa Fontaine is a 55 y.o. Female with hx of unspecified psychosis who was admitted to station [facility] for psychosis. The patient was admitted on a 72 hour hold status. The treatment team initiated appropriate safety precautions. The patient was admitted for evaluation, stabilization and treatment for the working diagnosis of unspecified psychosis. FEP w/u completed 03 and 04/2021 and was normal, included  basic labs plus TSH, heavy metal panel, hepatitis panel, PAULINA, B12, folate, VitD, RPR, HIV, Lyme, ESR, ceruloplasmin, as well as EEG and MRI. Duration of symptoms now around 5 mos. She stabilized well on olanzapine during her 04/2021 admission, but has had poor adherence since. It is unusual but not unheard of for schizophrenia onset to occur later in life, near the menopausal transition in women. There have not been prominent mood symptoms. Psychosis symptoms at present include AH w/o commands, transient paranoid delusions, passivity phenomena. No substance use. Safety concerns include wandering at night trying to locate the source of AH. Will monitor in hospital environment to determine if petition is indicated. Patient is agreeable to resume olanzapine to target sleep and AH.    Change in Psychiatric Symptoms/Ongoing Assessment:  Patient maintained safe and appropriate behaviors throughout this hospitalization. She attended to her basic needs well, including grooming and eating. Ongoing discussion with collateral did not uncover any additional safety concerns. There was not any mau paranoia, SI, or HI. The most concerning behavior PTA was that patient went out to her garage to investigate hallucinatory noises. Overall patient's safety concerns do not rise to level warranting petition for commitment, so 72HH was dropped and she discharged home. Patient was partially cooperative with treatment team and was adherent to starting dose of risperidone during this admission. AH and sleep did improve with risperidone. Patient remained with a delusion that her family was running out of money and that none of her healthcare is covered by insurance. She remained very ambivalent about taking medications and working with an outpatient psychiatrist and is high risk for non-adherence. She remained with poor insight into her psychosis. Offered and recommended continued hospitalization on a voluntary basis for adequate  "stabilization and titration of risperidone, however patient declined and preferred to discharge as soon as we allowed her to do so.    Medication Trials and Changes:  Initially restarted Zyprexa 5 mg, and patient was offered option to switch to risperidone for its more favorable metabolic profile. She elected to switch, and risperidone was started at 1 mg QHS.    Risk Assessment:  Low acute risk of harm to self or others. A reasonable discharge plan was in place, we determined that the patient had achieved optimal medical benefit from hospitalization, and patient was discharged with follow-up as detailed below.     The multidisciplinary treatment team met (RN, OT, , Physician) on a daily basis to discuss patient care and treatment planning. The psychiatric inpatient setting provided close nursing supervision and access to multiple treatment modalities and programming (group therapy, OT, one-to-one therapy.) Patient support systems such as family, , and other care providers were contacted as appropriate for collateral information and treatment planning.    Legal Status: 72HH  Group Attendance: good  Level of cooperation and Medication adherence: fair  Discharge planning and coordination of care: Patient was discharged to home. Patient was prescribed a 30-day supply of all medication. Follow-up appointment(s) were made: see below.  Tobacco: Cessation counseling not applicable. Patient does not use tobacco.    Consults   No consultations were completed during this hospitalization.    Mental Status Exam on Discharge   /63  Pulse 70  Temp 98.2  F (36.8  C) (Oral)  Resp 16  Ht 5' 6\" (1.676 m)  Wt 76.9 kg (169 lb 9.6 oz)  SpO2 100%  BMI 27.37 kg/m    Appearance: wearing scrubs casually groomed sitting on bed  Attitude: engaged, friendly, partially cooperative  Motor: normal  Gait and Station: normal  Speech: normal prosody (rate) and latency (delay)  Language: intact  Thought process " and associations: mild disorganization, derails, poor tracking  Thought content: AH, preoccupation with finances bordering on delusion, no SI/HI, no VH  Mood: description consistent with euthymia  Affect: mood-congruent and preserved range of affect, not blunted  Orientation: Oriented to person, place, time and situation  Attention: intact  Memory: recent memory impaired, remote memory intact  Fund of Knowledge: normal  Insight: poor  Judgment: adequate for safety       ASSESSMENT      Discharge Assessment  How are you doing now that you are home?: Doing ok  How are your symptoms? (Red Flag symptoms escalate to triage hotline per guidelines): Improved  Do you feel your condition is stable enough to be safe at home until your provider visit?: Yes  Does the patient have their discharge instructions? : Yes  Does the patient have questions regarding their discharge instructions? : No  Were you started on any new medications or were there changes to any of your previous medications? : Yes - Schedule RNCC appt within 48 business hours  Does the patient have all of their medications?: Yes  Do you have questions regarding any of your medications? : Yes - Connect patient to RN  Do you have all of your needed medical supplies or equipment (DME)?  (i.e. oxygen tank, CPAP, cane, etc.): Yes  Discharge follow-up appointment scheduled within 14 calendar days? : Yes  Discharge Follow Up Appointment Date: 08/20/21  Discharge Follow Up Appointment Scheduled with?: Primary Care Provider    Post-op  Did the patient have surgery or a procedure: No  Fever: No  Chills: No  Eating & Drinking: eating and drinking without complaints/concerns  Bowel Function: normal  Date of last BM: 08/16/21  Urinary Status: voiding without complaint/concerns    Care Management   Community Health Worker Initial Outreach    Patient accepts CC: No, not at this time. Patient will be sent Care Coordination introduction letter for future reference.     PLAN                                                       Outpatient Plan:  Comments: Date: 01 September 2021 Time: 10:00AM Provider: Dr Yesi Schultz MD   [facility] [address] [place] [address]    Phone: [phone]  This is a telephone appointment. If you have questions or need to change or cancel this appointment, please ring the above number at least 24 hours prior to your scheduled appointment time. Failure to keep appointments may result in the clinic declining to schedule additional appointments in the future.   References:&nbsp;&nbsp;&nbsp;&nbsp;Specialty Connection   Question Answer Comment   Appointment Urgency? Non-Urgent   Reason for visit? Psychiatry       Future Appointments   Date Time Provider Department Center   8/20/2021  1:00 PM [facility]         For any urgent concerns, please contact our 24 hour nurse triage line: [phone] ([phone])         Carlyn Orantes

## 2021-08-16 NOTE — LETTER
[facility] CARE COORDINATION  [address] [facility] [address]    August 16, 2021    Lisa Fontaine  [address] [address]      Dear Lisa,    I am a clinic community health worker who works with Maura Perry MD at [facility]. I wanted to thank you for spending the time to talk with me.  Below is a description of clinic care coordination and how I can further assist you.      The clinic care coordination team is made up of a registered nurse,  and community health worker who understand the health care system. The goal of clinic care coordination is to help you manage your health and improve access to the health care system in the most efficient manner. The team can assist you in meeting your health care goals by providing education, coordinating services, strengthening the communication among your providers and supporting you with any resource needs.    Please feel free to contact me at [phone] with any questions or concerns. We are focused on providing you with the highest-quality healthcare experience possible and that all starts with you.     Sincerely,     Carlyn Orantes  Community Health Worker  [facility] Care Coordination     Office: [phone]

## 2021-08-20 ENCOUNTER — OFFICE VISIT (OUTPATIENT)
Dept: FAMILY MEDICINE | Facility: CLINIC | Age: 56
End: 2021-08-20
Payer: COMMERCIAL

## 2021-08-20 VITALS
SYSTOLIC BLOOD PRESSURE: 108 MMHG | TEMPERATURE: 96.1 F | WEIGHT: 169.4 LBS | HEART RATE: 67 BPM | HEIGHT: 66 IN | DIASTOLIC BLOOD PRESSURE: 69 MMHG | BODY MASS INDEX: 27.23 KG/M2

## 2021-08-20 DIAGNOSIS — F29 PSYCHOSIS, UNSPECIFIED PSYCHOSIS TYPE (H): Primary | ICD-10-CM

## 2021-08-20 PROCEDURE — 99215 OFFICE O/P EST HI 40 MIN: CPT | Performed by: FAMILY MEDICINE

## 2021-08-20 RX ORDER — RISPERIDONE 1 MG/1
1 TABLET ORAL
COMMUNITY
Start: 2021-08-05 | End: 2021-08-27

## 2021-08-20 ASSESSMENT — MIFFLIN-ST. JEOR: SCORE: 1389.11

## 2021-08-20 NOTE — PATIENT INSTRUCTIONS
Please stay on the daily risperdal medication    Please see me in one week.    Expect a call from [facility] re; an appointment with Dr Butler.

## 2021-08-20 NOTE — PROGRESS NOTES
Assessment & Plan     Psychosis, unspecified psychosis type (H)  Geoffrey - called eduardo to see if Dr Hale would resume her care  Mae/Taryn -  I called both the numbers she gave me to clarify what the therapy and crisis plan of care was with the county.    risperdal - - she agrees to restart this med.   I asked her to f/u with me in one week.       Return in about 1 week (around 8/27/2021) for in clinic with me .    TT =  45 minutes, more than half of the time was spent discussing plan, reviewing records, calling her care team,  and coordinating care.      Maura Perry MD  [facility] KAILEE Gonzalez is a 55 year old who presents for the following health issues       Hospital Follow-up Visit:    Hospital/Nursing Home/IP Rehab Facility: [facility]   Date of Admission: 08/03/2021  Date of Discharge: 08/05/2021?   Reason(s) for Admission: psychiatry      Was your hospitalization related to COVID-19? No   Problems taking medications regularly:  Risperdal- not taking feels like is was not helping   Medication changes since discharge: see medication list   Problems adhering to non-medication therapy:  None.     Summary of hospitalization:  [facility] discharge summary reviewed  Diagnostic Tests/Treatments reviewed.  Follow up needed: me, crisis counselor, and psych   Other Healthcare Providers Involved in Patient s Care:         see above  Update since discharge: fluctuating course.       Post Discharge Medication Reconciliation: discharge medications reconciled and changed, per note/orders.  Plan of care communicated with patient and other healthcare provider                Complicated patient with new onset psychosis this year  Was set up with psych and stopped her med and had a recurrence of psychotic thought processes.  took her in to ER .      is asking her to take the meds  She says he is trying to be supportive and she  "appreciates it     Contacted by \"[facility]. Says she was told that she would have a year of crisis counseling    Had a psych tele appointment set up but canceled that.     Quit her job of 23 years recently     Feels like the things that are happening to her are \"man made\" - can't describe what that means - Tearful  Says that is because she just \"wants it to be over\" - meaning the psychosis. Adamantly denies si/hi thoughts/plan.  Nervous, hearing noises  ER note reviewed  - these are similar thoughts to what she had in the ER     Was put on risperdal in [facility] - she has already stopped it.     Feels she should get a - says all of this is going to cost too much. Feels she doesn't have psychosis    This was her appointment and she canceled it :   Behavioral Health Follow-Ups   Comments: Date: 01 September 2021 Time: 10:00AM Provider: Dr Yesi Schultz MD   [facility] [address] [place] [address]    Phone: [phone]  This is a telephone appointment. If you have questions or need to change or cancel this appointment, please ring the above number at least 24 hours prior to your scheduled appointment time. Failure to keep appointments may result in the clinic declining to schedule additional appointments in the future.   References:&nbsp;&nbsp;&nbsp;&nbsp;Specialty Connection   Question Answer Comment   Appointment Urgency? Non-Urgent   Reason for visit? Psychiatry     Not taking the psych med - risperdal   Says it makes her head buzz    Change in Psychiatric Symptoms/Ongoing Assessment:  Patient maintained safe and appropriate behaviors throughout this hospitalization. She attended to her basic needs well, including grooming and eating. Ongoing discussion with collateral did not uncover any additional safety concerns. There was not any mau paranoia, SI, or HI. The most concerning behavior PTA was that patient went out to her " "garage to investigate hallucinatory noises. Overall patient's safety concerns do not rise to level warranting petition for commitment, so 72HH was dropped and she discharged home. Patient was partially cooperative with treatment team and was adherent to starting dose of risperidone during this admission. AH and sleep did improve with risperidone. Patient remained with a delusion that her family was running out of money and that none of her healthcare is covered by insurance. She remained very ambivalent about taking medications and working with an outpatient psychiatrist and is high risk for non-adherence. She remained with poor insight into her psychosis. Offered and recommended continued hospitalization on a voluntary basis for adequate stabilization and titration of risperidone, however patient declined and preferred to discharge as soon as we allowed her to do so.        Review of Systems   Constitutional, HEENT, cardiovascular, pulmonary, gi and gu systems are negative, except as otherwise noted.      Objective    /69   Pulse 67   Temp (!) 96.1  F (35.6  C) (Tympanic)   Ht 1.683 m (5' 6.25\")   Wt 76.8 kg (169 lb 6.4 oz)   LMP 01/01/2008   BMI 27.14 kg/m    Body mass index is 27.14 kg/m .  Physical Exam   GENERAL - Pt is alert and tearful. Quiet today. Minimal eye contact.  .  PSYCH -  clean and well-dressed. Oriented to person,place,and time. Cooperative. No speech abnormalities. No psychomotor agitation or retardation.  Thought process was abnormal as described above. thought content was free of suicidal/homicidal ideation and obsessions, but she is having some delusions.  Insight and judgement were fair.            "

## 2021-08-24 ENCOUNTER — PATIENT OUTREACH (OUTPATIENT)
Dept: CARE COORDINATION | Facility: CLINIC | Age: 56
End: 2021-08-24

## 2021-08-24 NOTE — PROGRESS NOTES
Clinic Care Coordination Contact  [facility]/Voicemail       Clinical Data: Care Coordinator Outreach  Outreach attempted x 1.  Left message on patient's voicemail with call back information and requested return call.  Plan: Care Coordinator will try to reach patient again in 1-2 business days.      8/24/21 CHW received msg from PCP Dr. Perry. CHW called Dr. Perry back. PCP relayed patient may be more interested in help from CCC to assist with patient getting re-established with her Psychologist and Psychiatrist. And possibly wanting to looking into other insurance options.

## 2021-08-25 ENCOUNTER — PATIENT OUTREACH (OUTPATIENT)
Dept: NURSING | Facility: CLINIC | Age: 56
End: 2021-08-25

## 2021-08-27 ENCOUNTER — OFFICE VISIT (OUTPATIENT)
Dept: FAMILY MEDICINE | Facility: CLINIC | Age: 56
End: 2021-08-27
Payer: COMMERCIAL

## 2021-08-27 VITALS
HEIGHT: 66 IN | DIASTOLIC BLOOD PRESSURE: 65 MMHG | SYSTOLIC BLOOD PRESSURE: 121 MMHG | HEART RATE: 59 BPM | TEMPERATURE: 96 F | WEIGHT: 168 LBS | BODY MASS INDEX: 27 KG/M2

## 2021-08-27 DIAGNOSIS — F29 PSYCHOSIS, UNSPECIFIED PSYCHOSIS TYPE (H): Primary | ICD-10-CM

## 2021-08-27 DIAGNOSIS — Z28.21 COVID-19 VIRUS VACCINATION REFUSED: ICD-10-CM

## 2021-08-27 PROCEDURE — 99214 OFFICE O/P EST MOD 30 MIN: CPT | Performed by: FAMILY MEDICINE

## 2021-08-27 RX ORDER — RISPERIDONE 1 MG/1
1 TABLET ORAL DAILY
Qty: 30 TABLET | Refills: 0 | Status: SHIPPED | OUTPATIENT
Start: 2021-08-27 | End: 2023-07-21

## 2021-08-27 ASSESSMENT — MIFFLIN-ST. JEOR: SCORE: 1382.76

## 2021-08-27 NOTE — PROGRESS NOTES
"    Assessment & Plan     Psychosis, unspecified psychosis type (H)    Long discussion with Lisa and her , Morgan.   She is very hesitant to take the medication.   She is hesitant to see psych too.   She wants the voices and people \"badgering her\" to stop so we talked about how the med would help with that.   We asked her to trust us and take the med.  is on board.   Will have them follow-up in one week with me or with Dr Butler.     - risperiDONE (RISPERDAL) 1 MG tablet; Take 1 tablet (1 mg) by mouth daily          (Z28.21) COVID-19 virus vaccination refused  Comment: she is too overwhelmed to do this right now   Plan:      Return in about 3 months (around 11/27/2021).    Maura Perry MD  [facility]    Pravin Gonzalez is a 55 year old who presents for the following health issues  accompanied by her  Morgan.    Here for follow-up from a week ago when we had a post hospital visit  - see my visit for details of her situation     She reports that she has heard voices telling her to do things   Has been inpatient psych 3x since march.     Says that it feels like people are badgering her, touching her, bothering her.      sits with her nightly to get her to take the med  She has been spitting it out this week - he didn't know this     She says it makes her feel foggy   He says she sleeps better on the med     When she hears the voices, she is roaming the house, doing nonsensical things.        Review of Systems   Constitutional, HEENT, cardiovascular, pulmonary, gi and gu systems are negative, except as otherwise noted.      Objective    /65   Pulse 59   Temp (!) 96  F (35.6  C) (Tympanic)   Ht 1.683 m (5' 6.25\")   Wt 76.2 kg (168 lb)   LMP 01/01/2008   BMI 26.91 kg/m    Body mass index is 26.91 kg/m .  Physical Exam   PSYCH - Pt makes good eye contact, is clean and well-dressed. Tearful, occasionally angry Oriented to person,place,and time. Cooperative. No " speech abnormalities. slight psychomotor agitation.  Thought content with  delusions.   Insight and judgement were fair

## 2021-08-31 ENCOUNTER — PATIENT OUTREACH (OUTPATIENT)
Dept: NURSING | Facility: CLINIC | Age: 56
End: 2021-08-31
Payer: COMMERCIAL

## 2021-08-31 NOTE — PROGRESS NOTES
"Clinic Care Coordination Contact  Community Health Worker Initial Outreach    CHW Initial Information Gathering:  Referral Source: PCP  No PCP office visit in Past Year: Yes  CHW Additional Questions  If ED/Hospital discharge, follow-up appointment scheduled as recommended?: N/A  Medication changes made following ED/Hospital discharge?: Yes, patient to be scheduled with CC RN/SW within approx 1 business day  MyChart active?: No    Patient accepts CC: No, patient has a follow up appt with PCP re: Risperdal medication and would like to discuss CCC in detail with MD prior to agreement with enrolling for support/resources from  CC. Patient will be sent Care Coordination introduction letter for future reference.     The Clinic Community Health Worker spoke with the patient today at the request of the PCP to discuss possible Clinic Care Coordination enrollment. The service was described to the patient and immediate needs were discussed. Patient asked CHW to please notify PCP that she spoke to writer about care coordination.  CHW routed encounter to PCP with the following message:     Sonia Perry,   I am a community health worker with clinic care coordination and was able to connect  with patient today regarding the recent referral to assist patient with support/resources  needed. Patient was extremely reluctant to set up a telephone visit/assessment with  SW CC and wanted to wait until a conversation can occur with PCP Friday 9/3.  I  attempted to explain that the  Care Coordinator could assist patient in figuring out  next steps and will set up goals with patient, checking in every month over the phone to  make sure patient feels well supported. Patient continued to state that she was \"unsure\"  and didn't know what to do.   Patient was clear that she wanted PCP to know that she spoke with me today regarding care coordination, although did not commit to moving forward with assessment.   _________________    The " patient declined enrollment at this time. The PCP is encouraged to refer in the future if appropriate and patient agrees to enrollment.    Heather LAKE  Community Health Worker  [facility] Care Coordination  [facility]  [email or web address]  [email or web address]   Office: [phone]

## 2021-09-03 ENCOUNTER — OFFICE VISIT (OUTPATIENT)
Dept: FAMILY MEDICINE | Facility: CLINIC | Age: 56
End: 2021-09-03
Payer: COMMERCIAL

## 2021-09-03 VITALS
TEMPERATURE: 96.4 F | HEIGHT: 66 IN | WEIGHT: 167.7 LBS | HEART RATE: 65 BPM | DIASTOLIC BLOOD PRESSURE: 82 MMHG | SYSTOLIC BLOOD PRESSURE: 121 MMHG | BODY MASS INDEX: 26.95 KG/M2

## 2021-09-03 DIAGNOSIS — F29 PSYCHOSIS, UNSPECIFIED PSYCHOSIS TYPE (H): Primary | ICD-10-CM

## 2021-09-03 DIAGNOSIS — Z12.31 ENCOUNTER FOR SCREENING MAMMOGRAM FOR BREAST CANCER: ICD-10-CM

## 2021-09-03 DIAGNOSIS — Z28.21 COVID-19 VIRUS VACCINATION REFUSED: ICD-10-CM

## 2021-09-03 PROCEDURE — 90471 IMMUNIZATION ADMIN: CPT | Performed by: FAMILY MEDICINE

## 2021-09-03 PROCEDURE — 99213 OFFICE O/P EST LOW 20 MIN: CPT | Mod: 25 | Performed by: FAMILY MEDICINE

## 2021-09-03 PROCEDURE — 90714 TD VACC NO PRESV 7 YRS+ IM: CPT | Performed by: FAMILY MEDICINE

## 2021-09-03 ASSESSMENT — MIFFLIN-ST. JEOR: SCORE: 1381.4

## 2021-09-03 NOTE — NURSING NOTE
Prior to immunization administration, verified patients identity using patient s name and date of birth. Please see Immunization Activity for additional information.     Screening Questionnaire for Adult Immunization    Are you sick today?   No   Do you have allergies to medications, food, a vaccine component or latex?   Don't Know   Have you ever had a serious reaction after receiving a vaccination?   No   Do you have a long-term health problem with heart, lung, kidney, or metabolic disease (e.g., diabetes), asthma, a blood disorder, no spleen, complement component deficiency, a cochlear implant, or a spinal fluid leak?  Are you on long-term aspirin therapy?   No   Do you have cancer, leukemia, HIV/AIDS, or any other immune system problem?   No   Do you have a parent, brother, or sister with an immune system problem?   No   In the past 3 months, have you taken medications that affect  your immune system, such as prednisone, other steroids, or anticancer drugs; drugs for the treatment of rheumatoid arthritis, Crohn s disease, or psoriasis; or have you had radiation treatments?   No   Have you had a seizure, or a brain or other nervous system problem?   No   During the past year, have you received a transfusion of blood or blood    products, or been given immune (gamma) globulin or antiviral drug?   No   For women: Are you pregnant or is there a chance you could become       pregnant during the next month?   No   Have you received any vaccinations in the past 4 weeks?   No     Immunization questionnaire was positive for at least one answer.  Notified Dr. Fede Perry.        Per orders of Dr. Fede Perry, injection of TD given by Hermelinda HERRERA LPN. Patient instructed to remain in clinic for 15 minutes afterwards, and to report any adverse reaction to me immediately.       Screening performed by Hermelinda Owens LPN on 9/3/2021 at 2:02 PM.

## 2021-09-03 NOTE — PROGRESS NOTES
"    Assessment & Plan     Psychosis, unspecified psychosis type (H)  She reports that she is taking the med. She does seem more clear and reasonable today though still doesn't seem to believe that she has a mental health issue. Minimizing. Reports that she has an upcoming visit with psych.  We tried to call her  to confirm but couldn't get ahold of him. Will try again.     Encounter for screening mammogram for breast cancer  Discussed and ordered   - MA Screen Bilateral w/Tanner; Future    COVID-19 virus vaccination refused  We discussed the importance of getting a covid vaccine, including personal and population immunity, efficacy, options, risks and benefits.  She declines vaccination today      Return in about 2 weeks (around 9/17/2021) for as needed based on discussion in clinic.    Maura Perry MD  [facility] KAILEE Gonzalez is a 55 year old who presents for the following health issues:     Go over Care Coordination :    Recheck on medication   - says she has been taking it   Risperdone 1mg   Less buzzing in her ears    More emotional this week  No thoughts about co-workers - no more thoughts about badgering     Dr Butler - video conference with him at [facility] - per her report  We got a note from Dr Butler that she declined an appointment and won't see him...    We tried to call her  to confirm things but he isn't answering today     Care coordination - she says she doesn't need this     Review of Systems   Constitutional, HEENT, cardiovascular, pulmonary, gi and gu systems are negative, except as otherwise noted.      Objective    /82   Pulse 65   Temp (!) 96.4  F (35.8  C) (Tympanic)   Ht 1.683 m (5' 6.25\")   Wt 76.1 kg (167 lb 11.2 oz)   LMP 01/01/2008   BMI 26.86 kg/m    Body mass index is 26.86 kg/m .  Physical Exam   GENERAL: healthy, alert and no distress  Speech is clear   Not tearful today   Still rationalizing and minimizing experience in psych wards " and with delusions

## 2021-10-15 ENCOUNTER — HOSPITAL ENCOUNTER (OUTPATIENT)
Dept: MAMMOGRAPHY | Facility: CLINIC | Age: 56
Discharge: HOME | End: 2021-10-15
Attending: FAMILY MEDICINE | Admitting: FAMILY MEDICINE
Payer: COMMERCIAL

## 2021-10-15 DIAGNOSIS — Z12.31 ENCOUNTER FOR SCREENING MAMMOGRAM FOR BREAST CANCER: ICD-10-CM

## 2021-10-15 PROCEDURE — 77067 SCR MAMMO BI INCL CAD: CPT

## 2021-12-22 ENCOUNTER — LAB (OUTPATIENT)
Dept: LAB | Facility: CLINIC | Age: 56
End: 2021-12-22
Payer: COMMERCIAL

## 2021-12-22 DIAGNOSIS — Z79.899 HIGH RISK MEDICATION USE: Primary | ICD-10-CM

## 2021-12-22 LAB
CHOLEST SERPL-MCNC: 270 MG/DL
FASTING STATUS PATIENT QL REPORTED: YES
FASTING STATUS PATIENT QL REPORTED: YES
GLUCOSE BLD-MCNC: 86 MG/DL (ref 70–99)
HBA1C MFR BLD: 5.1 % (ref 0–5.6)
HDLC SERPL-MCNC: 66 MG/DL
LDLC SERPL CALC-MCNC: 184 MG/DL
NONHDLC SERPL-MCNC: 204 MG/DL
PROLACTIN SERPL-MCNC: 170 UG/L (ref 3–27)
TRIGL SERPL-MCNC: 101 MG/DL

## 2021-12-22 PROCEDURE — 84146 ASSAY OF PROLACTIN: CPT

## 2021-12-22 PROCEDURE — 82947 ASSAY GLUCOSE BLOOD QUANT: CPT

## 2021-12-22 PROCEDURE — 80061 LIPID PANEL: CPT

## 2021-12-22 PROCEDURE — 36415 COLL VENOUS BLD VENIPUNCTURE: CPT

## 2021-12-22 PROCEDURE — 83036 HEMOGLOBIN GLYCOSYLATED A1C: CPT

## 2022-01-12 ENCOUNTER — DOCUMENTATION ONLY (OUTPATIENT)
Dept: LAB | Facility: CLINIC | Age: 57
End: 2022-01-12
Payer: COMMERCIAL

## 2022-02-02 ENCOUNTER — DOCUMENTATION ONLY (OUTPATIENT)
Dept: LAB | Facility: CLINIC | Age: 57
End: 2022-02-02
Payer: COMMERCIAL

## 2022-02-02 NOTE — PROGRESS NOTES
Lisa Fontaine has an upcoming lab appointment:    Future Appointments   Date Time Provider Department Center   2/3/2022  8:30 AM [facility]UG[facility]     Patient is scheduled for the following lab(s): none    There is no order available. Please review and place either future orders or HMPO (Review of Health Maintenance Protocol Orders), as appropriate.    Health Maintenance Due   Topic     ANNUAL REVIEW OF HM ORDERS      Ebony Dunn

## 2022-02-03 ENCOUNTER — LAB (OUTPATIENT)
Dept: LAB | Facility: CLINIC | Age: 57
End: 2022-02-03
Payer: COMMERCIAL

## 2022-02-03 DIAGNOSIS — F31.81 BIPOLAR II DISORDER (H): Primary | ICD-10-CM

## 2022-02-03 LAB
CHOLEST SERPL-MCNC: 265 MG/DL
FASTING STATUS PATIENT QL REPORTED: YES
HBA1C MFR BLD: 5 % (ref 0–5.6)
HDLC SERPL-MCNC: 65 MG/DL
LDLC SERPL CALC-MCNC: 181 MG/DL
NONHDLC SERPL-MCNC: 200 MG/DL
PROLACTIN SERPL-MCNC: 100 UG/L (ref 3–27)
TRIGL SERPL-MCNC: 94 MG/DL

## 2022-02-03 PROCEDURE — 36415 COLL VENOUS BLD VENIPUNCTURE: CPT

## 2022-02-03 PROCEDURE — 84146 ASSAY OF PROLACTIN: CPT

## 2022-02-03 PROCEDURE — 83036 HEMOGLOBIN GLYCOSYLATED A1C: CPT

## 2022-02-03 PROCEDURE — 80061 LIPID PANEL: CPT

## 2022-02-17 PROBLEM — Z28.21 SEVERE ACUTE RESPIRATORY SYNDROME CORONAVIRUS 2 (SARS-COV-2) VACCINATION DECLINED: Status: ACTIVE | Noted: 2021-08-27

## 2022-03-08 ENCOUNTER — LAB (OUTPATIENT)
Dept: LAB | Facility: CLINIC | Age: 57
End: 2022-03-08
Payer: COMMERCIAL

## 2022-03-08 DIAGNOSIS — Z79.899 HIGH RISK MEDICATION USE: Primary | ICD-10-CM

## 2022-03-08 LAB — PROLACTIN SERPL-MCNC: 50 UG/L (ref 3–27)

## 2022-03-08 PROCEDURE — 84146 ASSAY OF PROLACTIN: CPT

## 2022-03-08 PROCEDURE — 36415 COLL VENOUS BLD VENIPUNCTURE: CPT

## 2022-03-28 ENCOUNTER — LAB (OUTPATIENT)
Dept: LAB | Facility: CLINIC | Age: 57
End: 2022-03-28
Payer: COMMERCIAL

## 2022-03-28 DIAGNOSIS — F32.3 MAJOR DEPRESSIVE DISORDER WITH PSYCHOTIC FEATURES (H): Primary | ICD-10-CM

## 2022-03-28 LAB — PROLACTIN SERPL-MCNC: 102 UG/L (ref 3–27)

## 2022-03-28 PROCEDURE — 84146 ASSAY OF PROLACTIN: CPT

## 2022-03-28 PROCEDURE — 36415 COLL VENOUS BLD VENIPUNCTURE: CPT

## 2023-06-27 ENCOUNTER — HOSPITAL ENCOUNTER (OUTPATIENT)
Dept: MAMMOGRAPHY | Facility: CLINIC | Age: 58
Discharge: HOME | End: 2023-06-27
Attending: FAMILY MEDICINE | Admitting: FAMILY MEDICINE
Payer: COMMERCIAL

## 2023-06-27 DIAGNOSIS — Z12.31 VISIT FOR SCREENING MAMMOGRAM: ICD-10-CM

## 2023-06-27 PROCEDURE — 77067 SCR MAMMO BI INCL CAD: CPT

## 2023-07-21 ENCOUNTER — OFFICE VISIT (OUTPATIENT)
Dept: FAMILY MEDICINE | Facility: CLINIC | Age: 58
End: 2023-07-21
Payer: COMMERCIAL

## 2023-07-21 VITALS
DIASTOLIC BLOOD PRESSURE: 74 MMHG | HEIGHT: 67 IN | TEMPERATURE: 97.8 F | WEIGHT: 223.6 LBS | OXYGEN SATURATION: 98 % | SYSTOLIC BLOOD PRESSURE: 120 MMHG | HEART RATE: 64 BPM | RESPIRATION RATE: 18 BRPM | BODY MASS INDEX: 35.09 KG/M2

## 2023-07-21 DIAGNOSIS — Z13.220 LIPID SCREENING: ICD-10-CM

## 2023-07-21 DIAGNOSIS — B35.1 ONYCHOMYCOSIS: ICD-10-CM

## 2023-07-21 DIAGNOSIS — Z28.21 NO VACCINATION-PT REFUSE: ICD-10-CM

## 2023-07-21 DIAGNOSIS — Z00.01 ENCOUNTER FOR ROUTINE ADULT MEDICAL EXAM WITH ABNORMAL FINDINGS: Primary | ICD-10-CM

## 2023-07-21 DIAGNOSIS — F29 PSYCHOSIS, UNSPECIFIED PSYCHOSIS TYPE (H): ICD-10-CM

## 2023-07-21 DIAGNOSIS — Z12.11 SCREEN FOR COLON CANCER: ICD-10-CM

## 2023-07-21 LAB
ALBUMIN SERPL BCG-MCNC: 4.6 G/DL (ref 3.5–5.2)
ALP SERPL-CCNC: 104 U/L (ref 35–104)
ALT SERPL W P-5'-P-CCNC: 20 U/L (ref 0–50)
AST SERPL W P-5'-P-CCNC: 26 U/L (ref 0–45)
BILIRUB DIRECT SERPL-MCNC: <0.2 MG/DL (ref 0–0.3)
BILIRUB SERPL-MCNC: 0.4 MG/DL
CHOLEST SERPL-MCNC: 231 MG/DL
ERYTHROCYTE [DISTWIDTH] IN BLOOD BY AUTOMATED COUNT: 12.6 % (ref 10–15)
HCT VFR BLD AUTO: 41.4 % (ref 35–47)
HDLC SERPL-MCNC: 71 MG/DL
HGB BLD-MCNC: 13.6 G/DL (ref 11.7–15.7)
LDLC SERPL CALC-MCNC: 142 MG/DL
MCH RBC QN AUTO: 28.6 PG (ref 26.5–33)
MCHC RBC AUTO-ENTMCNC: 32.9 G/DL (ref 31.5–36.5)
MCV RBC AUTO: 87 FL (ref 78–100)
NONHDLC SERPL-MCNC: 160 MG/DL
PLATELET # BLD AUTO: 310 10E3/UL (ref 150–450)
PROT SERPL-MCNC: 7.8 G/DL (ref 6.4–8.3)
RBC # BLD AUTO: 4.75 10E6/UL (ref 3.8–5.2)
TRIGL SERPL-MCNC: 90 MG/DL
WBC # BLD AUTO: 6.9 10E3/UL (ref 4–11)

## 2023-07-21 PROCEDURE — 85027 COMPLETE CBC AUTOMATED: CPT | Performed by: FAMILY MEDICINE

## 2023-07-21 PROCEDURE — 36415 COLL VENOUS BLD VENIPUNCTURE: CPT | Performed by: FAMILY MEDICINE

## 2023-07-21 PROCEDURE — 80076 HEPATIC FUNCTION PANEL: CPT | Performed by: FAMILY MEDICINE

## 2023-07-21 PROCEDURE — 99396 PREV VISIT EST AGE 40-64: CPT | Performed by: FAMILY MEDICINE

## 2023-07-21 PROCEDURE — 99213 OFFICE O/P EST LOW 20 MIN: CPT | Mod: 25 | Performed by: FAMILY MEDICINE

## 2023-07-21 PROCEDURE — 80061 LIPID PANEL: CPT | Performed by: FAMILY MEDICINE

## 2023-07-21 RX ORDER — TERBINAFINE HYDROCHLORIDE 250 MG/1
250 TABLET ORAL DAILY
Qty: 90 TABLET | Refills: 0 | Status: SHIPPED | OUTPATIENT
Start: 2023-07-21 | End: 2023-10-09

## 2023-07-21 ASSESSMENT — ENCOUNTER SYMPTOMS
ABDOMINAL PAIN: 0
DIARRHEA: 0
HEMATOCHEZIA: 0
CONSTIPATION: 0
FREQUENCY: 0
SHORTNESS OF BREATH: 0
HEMATURIA: 0
COUGH: 0
JOINT SWELLING: 0
HEARTBURN: 0
WEAKNESS: 0
ARTHRALGIAS: 0
FEVER: 0
MYALGIAS: 0
SORE THROAT: 0
EYE PAIN: 0
PALPITATIONS: 0
DYSURIA: 0
CHILLS: 0
PARESTHESIAS: 0
NERVOUS/ANXIOUS: 0
DIZZINESS: 0
HEADACHES: 0
BREAST MASS: 0
NAUSEA: 0

## 2023-07-21 ASSESSMENT — PATIENT HEALTH QUESTIONNAIRE - PHQ9
SUM OF ALL RESPONSES TO PHQ QUESTIONS 1-9: 0
10. IF YOU CHECKED OFF ANY PROBLEMS, HOW DIFFICULT HAVE THESE PROBLEMS MADE IT FOR YOU TO DO YOUR WORK, TAKE CARE OF THINGS AT HOME, OR GET ALONG WITH OTHER PEOPLE: NOT DIFFICULT AT ALL
SUM OF ALL RESPONSES TO PHQ QUESTIONS 1-9: 0

## 2023-07-21 NOTE — PROGRESS NOTES
SUBJECTIVE:   CC: Lisa is an 57 year old who presents for preventive health visit.       7/21/2023    10:13 AM   Additional Questions   Roomed by KENZIE Goetz   Accompanied by self     Healthy Habits:     Getting at least 3 servings of Calcium per day:  Yes    Bi-annual eye exam:  Yes    Dental care twice a year:  Yes    Sleep apnea or symptoms of sleep apnea:  None    Diet:  Regular (no restrictions)    Frequency of exercise:  2-3 days/week    Duration of exercise:  15-30 minutes    Taking medications regularly:  Yes    Medication side effects:  Not applicable    Additional concerns today:  No      Toenail problem- right foot, not on left foot   Has had prior issues with toenail fungus   Reporting some discoloration, and thickness     Declined covid and shingles vaccine today     Denies recurrence of psychosis - voices/thoughts     Today's PHQ-9 Score:       7/21/2023     9:57 AM   PHQ-9 SCORE   PHQ-9 Total Score MyChart 0   PHQ-9 Total Score 0         Social History     Tobacco Use    Smoking status: Never    Smokeless tobacco: Never   Substance Use Topics    Alcohol use: Yes     Comment: rare             7/21/2023    10:01 AM   Alcohol Use   Prescreen: >3 drinks/day or >7 drinks/week? Not Applicable          No data to display              Reviewed orders with patient.  Reviewed health maintenance and updated orders accordingly - Yes  Lab work is in process    Breast Cancer Screening:    FHS-7:       10/15/2021     8:30 AM 6/27/2023     6:07 PM 7/21/2023    10:01 AM   Breast CA Risk Assessment (FHS-7)   Did any of your first-degree relatives have breast or ovarian cancer? No No No   Did any of your relatives have bilateral breast cancer? No No No   Did any man in your family have breast cancer? No No No   Did any woman in your family have breast and ovarian cancer? No No No   Did any woman in your family have breast cancer before age 50 y? No No No   Do you have 2 or more relatives with breast and/or  "ovarian cancer? No No No   Do you have 2 or more relatives with breast and/or bowel cancer? No No No       Pertinent mammograms are reviewed under the imaging tab.    History of abnormal Pap smear:       Latest Ref Rng & Units 4/30/2021     9:28 AM 4/30/2021     8:40 AM 3/9/2016     1:23 PM   PAP / HPV   PAP (Historical)  NIL      HPV 16 DNA NEG^Negative  Negative  Negative    HPV 18 DNA NEG^Negative  Negative  Negative    Other HR HPV NEG^Negative  Negative  Negative      Reviewed and updated as needed this visit by clinical staff                  Reviewed and updated as needed this visit by Provider                   Review of Systems   Constitutional:  Negative for chills and fever.   HENT:  Negative for congestion, ear pain, hearing loss and sore throat.    Eyes:  Negative for pain and visual disturbance.   Respiratory:  Negative for cough and shortness of breath.    Cardiovascular:  Negative for chest pain, palpitations and peripheral edema.   Gastrointestinal:  Negative for abdominal pain, constipation, diarrhea, heartburn, hematochezia and nausea.   Breasts:  Negative for tenderness, breast mass and discharge.   Genitourinary:  Negative for dysuria, frequency, genital sores, hematuria, pelvic pain, urgency, vaginal bleeding and vaginal discharge.   Musculoskeletal:  Negative for arthralgias, joint swelling and myalgias.   Skin:  Negative for rash.   Neurological:  Negative for dizziness, weakness, headaches and paresthesias.   Psychiatric/Behavioral:  Negative for mood changes. The patient is not nervous/anxious.         OBJECTIVE:   /74   Pulse 64   Temp 97.8  F (36.6  C) (Tympanic)   Resp 18   Ht 1.69 m (5' 6.54\")   Wt 101.4 kg (223 lb 9.6 oz)   LMP 01/01/2008   SpO2 98%   BMI 35.51 kg/m    Physical Exam  GENERAL: healthy, alert and no distress  EYES: Eyes grossly normal to inspection, PERRL and conjunctivae and sclerae normal  HENT: ear canals and TM's normal, nose and mouth without ulcers or " lesions  NECK: no adenopathy, no asymmetry, masses, or scars and thyroid normal to palpation  RESP: lungs clear to auscultation - no rales, rhonchi or wheezes  BREAST: normal without masses, tenderness or nipple discharge and no palpable axillary masses or adenopathy  CV: regular rate and rhythm, normal S1 S2, no S3 or S4, no murmur, click or rub, no peripheral edema and peripheral pulses strong  ABDOMEN: soft, nontender, no hepatosplenomegaly, no masses and bowel sounds normal  MS: no gross musculoskeletal defects noted, no edema  SKIN: no suspicious lesions or rashes  NEURO: Normal strength and tone, mentation intact and speech normal  PSYCH: mentation appears normal, affect normal/bright      ASSESSMENT/PLAN:     (Z00.01) Encounter for routine adult medical exam with abnormal findings  (primary encounter diagnosis)  Comment: We discussed self breast exams, exercise 30mins/day, and calcium with vitamin D at 1200mg/day, preferably from dietary sources.  Diet, Weight loss, and Exercise were discussed as well.       (Z12.11) Screen for colon cancer  Comment: discussed   Plan: Colonoscopy Screening  Referral            (Z13.220) Lipid screening  Comment: discussed   Plan: Lipid panel reflex to direct LDL Non-fasting            (B35.1) Onychomycosis  Comment: discussed diagnosis and treatment as well as risks of meds. Check labs now and in 6 weeks. All questions answered. The patient indicates understanding of these issues and agrees with the plan.   Plan: CBC with platelets, Hepatic panel (Albumin,         ALT, AST, Bili, Alk Phos, TP), terbinafine         (LAMISIL) 250 MG tablet            (Z28.21) No vaccination-pt refuse  Comment: covid and shingrix   Plan:     (F29) Psychosis, unspecified psychosis type (H)  Comment: denies any symptoms at this time   Plan:         Patient has been advised of split billing requirements and indicates understanding: Yes      COUNSELING:  Reviewed preventive health  counseling, as reflected in patient instructions       Regular exercise       Healthy diet/nutrition        She reports that she has never smoked. She has never used smokeless tobacco.          Maura Perry MD  [facility]

## 2023-07-21 NOTE — LETTER
July 24, 2023      Lisa Fontaine  [address] [address]        Dear ,    We are writing to inform you of your test results.    cholesterol mildly elevated but overall risk is low, liver function is normal,  CBC( checks for infection, anemia, etc) is normal.         Resulted Orders   CBC with platelets   Result Value Ref Range    WBC Count 6.9 4.0 - 11.0 10e3/uL    RBC Count 4.75 3.80 - 5.20 10e6/uL    Hemoglobin 13.6 11.7 - 15.7 g/dL    Hematocrit 41.4 35.0 - 47.0 %    MCV 87 78 - 100 fL    MCH 28.6 26.5 - 33.0 pg    MCHC 32.9 31.5 - 36.5 g/dL    RDW 12.6 10.0 - 15.0 %    Platelet Count 310 150 - 450 10e3/uL   Hepatic panel (Albumin, ALT, AST, Bili, Alk Phos, TP)   Result Value Ref Range    Protein Total 7.8 6.4 - 8.3 g/dL    Albumin 4.6 3.5 - 5.2 g/dL    Bilirubin Total 0.4 <=1.2 mg/dL    Alkaline Phosphatase 104 35 - 104 U/L    AST 26 0 - 45 U/L      Comment:      Reference intervals for this test were updated on 6/12/2023 to more accurately reflect our healthy population. There may be differences in the flagging of prior results with similar values performed with this method. Interpretation of those prior results can be made in the context of the updated reference intervals.    ALT 20 0 - 50 U/L      Comment:      Reference intervals for this test were updated on 6/12/2023 to more accurately reflect our healthy population. There may be differences in the flagging of prior results with similar values performed with this method. Interpretation of those prior results can be made in the context of the updated reference intervals.      Bilirubin Direct <0.20 0.00 - 0.30 mg/dL   Lipid panel reflex to direct LDL Non-fasting   Result Value Ref Range    Cholesterol 231 (H) <200 mg/dL    Triglycerides 90 <150 mg/dL    Direct Measure HDL 71 >=50 mg/dL    LDL Cholesterol Calculated 142 (H) <=100 mg/dL    Non HDL Cholesterol 160 (H) <130 mg/dL    Narrative    Cholesterol  Desirable:   <200 mg/dL    Triglycerides  Normal:  Less than 150 mg/dL  Borderline High:  150-199 mg/dL  High:  200-499 mg/dL  Very High:  Greater than or equal to 500 mg/dL    Direct Measure HDL  Female:  Greater than or equal to 50 mg/dL   Male:  Greater than or equal to 40 mg/dL    LDL Cholesterol  Desirable:  <100mg/dL  Above Desirable:  100-129 mg/dL   Borderline High:  130-159 mg/dL   High:  160-189 mg/dL   Very High:  >= 190 mg/dL    Non HDL Cholesterol  Desirable:  130 mg/dL  Above Desirable:  130-159 mg/dL  Borderline High:  160-189 mg/dL  High:  190-219 mg/dL  Very High:  Greater than or equal to 220 mg/dL       If you have any questions or concerns, please call the clinic at the number listed above.       Sincerely,      Maura Perry MD             [employer]

## 2023-08-21 ENCOUNTER — LAB (OUTPATIENT)
Dept: LAB | Facility: CLINIC | Age: 58
End: 2023-08-21
Payer: COMMERCIAL

## 2023-08-21 DIAGNOSIS — B35.1 ONYCHOMYCOSIS: ICD-10-CM

## 2023-08-21 LAB
ALBUMIN SERPL BCG-MCNC: 4.4 G/DL (ref 3.5–5.2)
ALP SERPL-CCNC: 91 U/L (ref 35–104)
ALT SERPL W P-5'-P-CCNC: 24 U/L (ref 0–50)
AST SERPL W P-5'-P-CCNC: 23 U/L (ref 0–45)
BILIRUB DIRECT SERPL-MCNC: <0.2 MG/DL (ref 0–0.3)
BILIRUB SERPL-MCNC: 0.3 MG/DL
ERYTHROCYTE [DISTWIDTH] IN BLOOD BY AUTOMATED COUNT: 12.8 % (ref 10–15)
HCT VFR BLD AUTO: 39.9 % (ref 35–47)
HGB BLD-MCNC: 13 G/DL (ref 11.7–15.7)
MCH RBC QN AUTO: 28.4 PG (ref 26.5–33)
MCHC RBC AUTO-ENTMCNC: 32.6 G/DL (ref 31.5–36.5)
MCV RBC AUTO: 87 FL (ref 78–100)
PLATELET # BLD AUTO: 322 10E3/UL (ref 150–450)
PROT SERPL-MCNC: 7.2 G/DL (ref 6.4–8.3)
RBC # BLD AUTO: 4.58 10E6/UL (ref 3.8–5.2)
WBC # BLD AUTO: 5.7 10E3/UL (ref 4–11)

## 2023-08-21 PROCEDURE — 80076 HEPATIC FUNCTION PANEL: CPT

## 2023-08-21 PROCEDURE — 85027 COMPLETE CBC AUTOMATED: CPT

## 2023-08-21 PROCEDURE — 36415 COLL VENOUS BLD VENIPUNCTURE: CPT

## 2023-10-03 DIAGNOSIS — B35.1 ONYCHOMYCOSIS: ICD-10-CM

## 2023-10-03 NOTE — TELEPHONE ENCOUNTER
Pt requesting script to [facility]. She wasn't sure if she'd need another appointment or not.    Thank you,    Tyra Tello, AMBER Garza

## 2023-10-03 NOTE — TELEPHONE ENCOUNTER
Clinic rn - please call her. Was being treated for toenail fungus which is usually a 3 month course. How are her symptoms? Can she send a picture? I'm not sure if she needs any further medication.   JENNIFER Perry MD

## 2023-10-04 NOTE — TELEPHONE ENCOUNTER
Call placed to patient   First number out of service and the second number was a dial tone   Will need to re-call at another time    Jose Dixon RN

## 2023-10-05 RX ORDER — TERBINAFINE HYDROCHLORIDE 250 MG/1
250 TABLET ORAL DAILY
Qty: 90 TABLET | Refills: 0 | OUTPATIENT
Start: 2023-10-05

## 2023-10-05 NOTE — TELEPHONE ENCOUNTER
Call placed to patient   No answer; voicemail left requesting call back to Clinic RN at [phone]    Jose Dixon RN

## 2023-10-09 ENCOUNTER — ALLIED HEALTH/NURSE VISIT (OUTPATIENT)
Dept: FAMILY MEDICINE | Facility: CLINIC | Age: 58
End: 2023-10-09
Payer: COMMERCIAL

## 2023-10-09 DIAGNOSIS — B35.1 ONYCHOMYCOSIS: ICD-10-CM

## 2023-10-09 PROCEDURE — 99207 PR NO CHARGE NURSE ONLY: CPT

## 2023-10-09 RX ORDER — TERBINAFINE HYDROCHLORIDE 250 MG/1
250 TABLET ORAL DAILY
Qty: 30 TABLET | Refills: 0 | Status: SHIPPED | OUTPATIENT
Start: 2023-10-09 | End: 2023-11-28

## 2023-10-09 NOTE — PROGRESS NOTES
Patient presents to the Clinic looking for a refill of Terbinafine     Patient states she has approx 10 tablets left of previous prescription  States symptoms have improved while taking the medication  Patient's toenails are thickened and slightly discolored - patient has been cutting toenail back as it grows out     Denies symptoms at this time  Patient hoping for a refill as she does not want the fungal infection to worsen     Reviewed with Ayde Burks for a month refill - will forward to her to sign refill - medication pended to preferred pharmacy   Re-check if not improving    Discussed with patient  Patient prefers to schedule follow-up today - appointment scheduled with Dr. Perry for 11/28/2023 at 0910  If symptoms are resolved - will cancel appointment     Patient verbalized understanding  No further questions/concerns    Jose Dixon RN

## 2023-11-28 ENCOUNTER — OFFICE VISIT (OUTPATIENT)
Dept: FAMILY MEDICINE | Facility: CLINIC | Age: 58
End: 2023-11-28
Payer: COMMERCIAL

## 2023-11-28 VITALS
WEIGHT: 230.8 LBS | BODY MASS INDEX: 36.66 KG/M2 | TEMPERATURE: 97.6 F | RESPIRATION RATE: 18 BRPM | DIASTOLIC BLOOD PRESSURE: 84 MMHG | OXYGEN SATURATION: 100 % | SYSTOLIC BLOOD PRESSURE: 130 MMHG | HEART RATE: 70 BPM

## 2023-11-28 DIAGNOSIS — F29 PSYCHOSIS, UNSPECIFIED PSYCHOSIS TYPE (H): ICD-10-CM

## 2023-11-28 DIAGNOSIS — B35.1 ONYCHOMYCOSIS: Primary | ICD-10-CM

## 2023-11-28 LAB
ALBUMIN SERPL BCG-MCNC: 4.6 G/DL (ref 3.5–5.2)
ALP SERPL-CCNC: 92 U/L (ref 40–150)
ALT SERPL W P-5'-P-CCNC: 24 U/L (ref 0–50)
AST SERPL W P-5'-P-CCNC: 24 U/L (ref 0–45)
BILIRUB DIRECT SERPL-MCNC: <0.2 MG/DL (ref 0–0.3)
BILIRUB SERPL-MCNC: 0.2 MG/DL
ERYTHROCYTE [DISTWIDTH] IN BLOOD BY AUTOMATED COUNT: 12.7 % (ref 10–15)
HCT VFR BLD AUTO: 38.8 % (ref 35–47)
HGB BLD-MCNC: 12.9 G/DL (ref 11.7–15.7)
MCH RBC QN AUTO: 28.9 PG (ref 26.5–33)
MCHC RBC AUTO-ENTMCNC: 33.2 G/DL (ref 31.5–36.5)
MCV RBC AUTO: 87 FL (ref 78–100)
PLATELET # BLD AUTO: 328 10E3/UL (ref 150–450)
PROT SERPL-MCNC: 7.7 G/DL (ref 6.4–8.3)
RBC # BLD AUTO: 4.46 10E6/UL (ref 3.8–5.2)
WBC # BLD AUTO: 5.5 10E3/UL (ref 4–11)

## 2023-11-28 PROCEDURE — 36415 COLL VENOUS BLD VENIPUNCTURE: CPT | Performed by: FAMILY MEDICINE

## 2023-11-28 PROCEDURE — 85027 COMPLETE CBC AUTOMATED: CPT | Performed by: FAMILY MEDICINE

## 2023-11-28 PROCEDURE — 99214 OFFICE O/P EST MOD 30 MIN: CPT | Performed by: FAMILY MEDICINE

## 2023-11-28 PROCEDURE — 80076 HEPATIC FUNCTION PANEL: CPT | Performed by: FAMILY MEDICINE

## 2023-11-28 RX ORDER — TERBINAFINE HYDROCHLORIDE 250 MG/1
250 TABLET ORAL DAILY
Qty: 90 TABLET | Refills: 0 | Status: SHIPPED | OUTPATIENT
Start: 2023-11-28

## 2023-11-28 NOTE — LETTER
November 29, 2023      Lisa Fontaine  [address] [address]        Dear ,    We are writing to inform you of your test results.    Your liver functions and CBC( checks for infection, anemia, etc) are normal.     Resulted Orders   CBC with platelets   Result Value Ref Range    WBC Count 5.5 4.0 - 11.0 10e3/uL    RBC Count 4.46 3.80 - 5.20 10e6/uL    Hemoglobin 12.9 11.7 - 15.7 g/dL    Hematocrit 38.8 35.0 - 47.0 %    MCV 87 78 - 100 fL    MCH 28.9 26.5 - 33.0 pg    MCHC 33.2 31.5 - 36.5 g/dL    RDW 12.7 10.0 - 15.0 %    Platelet Count 328 150 - 450 10e3/uL   Hepatic panel (Albumin, ALT, AST, Bili, Alk Phos, TP)   Result Value Ref Range    Protein Total 7.7 6.4 - 8.3 g/dL    Albumin 4.6 3.5 - 5.2 g/dL    Bilirubin Total 0.2 <=1.2 mg/dL    Alkaline Phosphatase 92 40 - 150 U/L      Comment:      Reference intervals for this test were updated on 11/14/2023 to more accurately reflect our healthy population. There may be differences in the flagging of prior results with similar values performed with this method. Interpretation of those prior results can be made in the context of the updated reference intervals.    AST 24 0 - 45 U/L      Comment:      Reference intervals for this test were updated on 6/12/2023 to more accurately reflect our healthy population. There may be differences in the flagging of prior results with similar values performed with this method. Interpretation of those prior results can be made in the context of the updated reference intervals.    ALT 24 0 - 50 U/L      Comment:      Reference intervals for this test were updated on 6/12/2023 to more accurately reflect our healthy population. There may be differences in the flagging of prior results with similar values performed with this method. Interpretation of those prior results can be made in the context of the updated reference intervals.      Bilirubin Direct <0.20 0.00 - 0.30 mg/dL       If you have any  questions or concerns, please call the clinic at the number listed above.       Sincerely,      Maura Perry MD/  [facility]

## 2023-11-28 NOTE — PROGRESS NOTES
"  Assessment & Plan     (B35.1) Onychomycosis  (primary encounter diagnosis)  Comment: ok to continue to treat. Will check labs today and do 3 more months of meds. The patient indicates understanding of these issues and agrees with the plan.   Plan: terbinafine (LAMISIL) 250 MG tablet, CBC with         platelets, Hepatic panel (Albumin, ALT, AST,         Bili, Alk Phos, TP)            (F29) Psychosis, unspecified psychosis type (H)  Comment: talked with her for quite awhile trying to ascertain how we could help her. She is quite resistant to seeing psych but is clearly very bothered by the voices she hears in her head. She ultimately agreed to see the psychiatrists and I have placed an urgent referral to their team. She is encouraged to tell her , Morgan, this plan. He is not with her today. The patient indicates understanding of these issues and agrees with the plan.   Plan: Adult Mental Health  Referral             Maura Perry MD  [facility] KAILEE Gonzalez is a 58 year old, presenting for the following health issues:  Toenail      11/28/2023     8:56 AM   Additional Questions   Roomed by KENZIE Goetz   Accompanied by self       Toenail    History of Present Illness       Reason for visit:  Follow up to medication for my toenails    She eats 2-3 servings of fruits and vegetables daily.She consumes 1 sweetened beverage(s) daily.She exercises with enough effort to increase her heart rate 20 to 29 minutes per day.  She exercises with enough effort to increase her heart rate 3 or less days per week.   She is taking medications regularly.       Started in July 2023 - 4 months on meds for onychomycosis   Normal labs 8/2023   Nails are growing out somewhat     Asked about mood...    Seeing Janie rom[facility] [place]  Back on abilify x 2 months   She still has bouts of anger - (gets teary)  Sleep is hard  Working at [employer]   Says she wishes she was still " "working as an  - (begins crying here)    When I asked for clarification, she reports that she is hearing voices daily and buzzy noises around her \"all the time\"  The voices are mean to her - they demean/tease her as well as telling her to do tasks   They don't tell her to do anything harmful but they are very specific   They are making her say her social security number in the middle of the night  They talk about her previous colleagues and imply that people are taking her clients or that people aren't her friend  It bothers her constantly     Can't find her  license  Can't find jewelry   Feels that someone is taking her things  Worrying, tearful   Worrying about her phone being hacked  Doesn't want to put people's info into her phone or computer. Can't say exactly what she feels is happening but doesn't trust them     All this started nov 2020 through now   She was hospitalized at the start. Tried risperdal then stopped it  Does want help but doesn't want to have to meet with someone   is very supportive and she trusts him     Review of Systems   No weight loss/night sweats  No n/v   No diarrhea/constipation       Objective    /84   Pulse 70   Temp 97.6  F (36.4  C) (Tympanic)   Resp 18   Wt 104.7 kg (230 lb 12.8 oz)   LMP 01/01/2008   SpO2 100%   BMI 36.66 kg/m    Body mass index is 36.66 kg/m .  Physical Exam   GENERAL - Pt is alert and oriented in no acute distress.  Affect is appropriate. Good eye contact.  NECK - Neck is supple w/o LA or thyromegaly  RESPIRATORY - Clear to auscultation bilaterally.  No wheezing noted  CV - RRR, no murmurs, rubs, gallops.   EXTREM - No edema.  SKIN - toenails on the right foot with thickened yellowed tips but clear at the base   PSYCH - Pt makes good eye contact, is clean and well-dressed. Oriented to person,place,and time. Cooperative. No speech abnormalities. She is slightly agitated and emotionally labile - crying 3-4 times during " our visit. Thought content - presence of delusions, some that she can acknowledge and some that she can't. Insight and judgement were poor  Denies si/hi thoughts/plans

## 2025-02-26 NOTE — PROGRESS NOTES
Clinic Care Coordination Contact  Community Health Worker Initial Outreach    Patient accepts CC: No, Not at this time. Patient was sent Care Coordination introduction letter for future reference on 8/16/21.     ** CHW spoke with patient. Patient is unsure what she is wanting to do with making an appointment with a Psychiatrist and Psychologist. Patient is wanting to think about what her next steps will be and will call CHW back if she is wanting our assistance.   Negative

## 2025-09-02 ENCOUNTER — OFFICE VISIT (OUTPATIENT)
Dept: FAMILY MEDICINE | Facility: CLINIC | Age: 60
End: 2025-09-02
Payer: COMMERCIAL

## 2025-09-02 VITALS
BODY MASS INDEX: 36.74 KG/M2 | HEART RATE: 71 BPM | HEIGHT: 66 IN | OXYGEN SATURATION: 96 % | TEMPERATURE: 97.2 F | WEIGHT: 228.6 LBS | RESPIRATION RATE: 16 BRPM | SYSTOLIC BLOOD PRESSURE: 111 MMHG | DIASTOLIC BLOOD PRESSURE: 79 MMHG

## 2025-09-02 DIAGNOSIS — Z00.01 ENCOUNTER FOR ROUTINE ADULT MEDICAL EXAM WITH ABNORMAL FINDINGS: Primary | ICD-10-CM

## 2025-09-02 DIAGNOSIS — Z28.20 VACCINE REFUSED BY PATIENT: ICD-10-CM

## 2025-09-02 DIAGNOSIS — Z13.220 LIPID SCREENING: ICD-10-CM

## 2025-09-02 DIAGNOSIS — F29 PSYCHOSIS, UNSPECIFIED PSYCHOSIS TYPE (H): ICD-10-CM

## 2025-09-02 DIAGNOSIS — Z12.31 VISIT FOR SCREENING MAMMOGRAM: ICD-10-CM

## 2025-09-02 DIAGNOSIS — E66.812 CLASS 2 SEVERE OBESITY DUE TO EXCESS CALORIES WITH SERIOUS COMORBIDITY AND BODY MASS INDEX (BMI) OF 36.0 TO 36.9 IN ADULT (H): ICD-10-CM

## 2025-09-02 DIAGNOSIS — E66.01 CLASS 2 SEVERE OBESITY DUE TO EXCESS CALORIES WITH SERIOUS COMORBIDITY AND BODY MASS INDEX (BMI) OF 36.0 TO 36.9 IN ADULT (H): ICD-10-CM

## 2025-09-02 DIAGNOSIS — Z13.1 SCREENING FOR DIABETES MELLITUS: ICD-10-CM

## 2025-09-02 DIAGNOSIS — Z12.11 SCREEN FOR COLON CANCER: ICD-10-CM

## 2025-09-02 DIAGNOSIS — Z12.4 CERVICAL CANCER SCREENING: ICD-10-CM

## 2025-09-02 LAB
CHOLEST SERPL-MCNC: 267 MG/DL
FASTING STATUS PATIENT QL REPORTED: YES
FASTING STATUS PATIENT QL REPORTED: YES
GLUCOSE SERPL-MCNC: 101 MG/DL (ref 70–99)
HDLC SERPL-MCNC: 67 MG/DL
LDLC SERPL CALC-MCNC: 181 MG/DL
NONHDLC SERPL-MCNC: 200 MG/DL
TRIGL SERPL-MCNC: 96 MG/DL

## 2025-09-02 PROCEDURE — 99213 OFFICE O/P EST LOW 20 MIN: CPT | Mod: 25 | Performed by: FAMILY MEDICINE

## 2025-09-02 PROCEDURE — 82947 ASSAY GLUCOSE BLOOD QUANT: CPT | Performed by: FAMILY MEDICINE

## 2025-09-02 PROCEDURE — 3078F DIAST BP <80 MM HG: CPT | Performed by: FAMILY MEDICINE

## 2025-09-02 PROCEDURE — 80061 LIPID PANEL: CPT | Performed by: FAMILY MEDICINE

## 2025-09-02 PROCEDURE — 99396 PREV VISIT EST AGE 40-64: CPT | Performed by: FAMILY MEDICINE

## 2025-09-02 PROCEDURE — 87624 HPV HI-RISK TYP POOLED RSLT: CPT | Performed by: FAMILY MEDICINE

## 2025-09-02 PROCEDURE — 36415 COLL VENOUS BLD VENIPUNCTURE: CPT | Performed by: FAMILY MEDICINE

## 2025-09-02 PROCEDURE — 3074F SYST BP LT 130 MM HG: CPT | Performed by: FAMILY MEDICINE

## 2025-09-02 SDOH — HEALTH STABILITY: PHYSICAL HEALTH: ON AVERAGE, HOW MANY DAYS PER WEEK DO YOU ENGAGE IN MODERATE TO STRENUOUS EXERCISE (LIKE A BRISK WALK)?: 3 DAYS

## 2025-09-02 SDOH — HEALTH STABILITY: PHYSICAL HEALTH: ON AVERAGE, HOW MANY MINUTES DO YOU ENGAGE IN EXERCISE AT THIS LEVEL?: 30 MIN

## 2025-09-03 LAB
HPV HR 12 DNA CVX QL NAA+PROBE: NEGATIVE
HPV16 DNA CVX QL NAA+PROBE: NEGATIVE
HPV18 DNA CVX QL NAA+PROBE: NEGATIVE
HUMAN PAPILLOMA VIRUS FINAL DIAGNOSIS: NORMAL

## (undated) RX ORDER — GLYCOPYRROLATE 0.2 MG/ML
INJECTION, SOLUTION INTRAMUSCULAR; INTRAVENOUS
Status: DISPENSED
Start: 2018-05-02

## (undated) RX ORDER — LIDOCAINE HYDROCHLORIDE 10 MG/ML
INJECTION, SOLUTION EPIDURAL; INFILTRATION; INTRACAUDAL; PERINEURAL
Status: DISPENSED
Start: 2018-05-02